# Patient Record
Sex: MALE | Race: WHITE | NOT HISPANIC OR LATINO | Employment: FULL TIME | URBAN - METROPOLITAN AREA
[De-identification: names, ages, dates, MRNs, and addresses within clinical notes are randomized per-mention and may not be internally consistent; named-entity substitution may affect disease eponyms.]

---

## 2023-02-23 LAB
ALANINE AMINOTRANSFERASE (SGPT) (U/L) IN SER/PLAS: 34 U/L (ref 10–52)
ALBUMIN (G/DL) IN SER/PLAS: 4.2 G/DL (ref 3.4–5)
ALKALINE PHOSPHATASE (U/L) IN SER/PLAS: 51 U/L (ref 33–120)
ANION GAP IN SER/PLAS: 8 MMOL/L (ref 10–20)
ASPARTATE AMINOTRANSFERASE (SGOT) (U/L) IN SER/PLAS: 22 U/L (ref 9–39)
BILIRUBIN TOTAL (MG/DL) IN SER/PLAS: 0.5 MG/DL (ref 0–1.2)
CALCIDIOL (25 OH VITAMIN D3) (NG/ML) IN SER/PLAS: 24 NG/ML
CALCIUM (MG/DL) IN SER/PLAS: 9.2 MG/DL (ref 8.6–10.3)
CARBON DIOXIDE, TOTAL (MMOL/L) IN SER/PLAS: 29 MMOL/L (ref 21–32)
CHLORIDE (MMOL/L) IN SER/PLAS: 107 MMOL/L (ref 98–107)
CHOLESTEROL (MG/DL) IN SER/PLAS: 156 MG/DL (ref 0–199)
CHOLESTEROL IN HDL (MG/DL) IN SER/PLAS: 39.7 MG/DL
CHOLESTEROL/HDL RATIO: 3.9
CREATININE (MG/DL) IN SER/PLAS: 1.48 MG/DL (ref 0.5–1.3)
ERYTHROCYTE DISTRIBUTION WIDTH (RATIO) BY AUTOMATED COUNT: 12.5 % (ref 11.5–14.5)
ERYTHROCYTE MEAN CORPUSCULAR HEMOGLOBIN CONCENTRATION (G/DL) BY AUTOMATED: 33.3 G/DL (ref 32–36)
ERYTHROCYTE MEAN CORPUSCULAR VOLUME (FL) BY AUTOMATED COUNT: 89 FL (ref 80–100)
ERYTHROCYTES (10*6/UL) IN BLOOD BY AUTOMATED COUNT: 4.89 X10E12/L (ref 4.5–5.9)
GFR MALE: 60 ML/MIN/1.73M2
GLUCOSE (MG/DL) IN SER/PLAS: 104 MG/DL (ref 74–99)
HEMATOCRIT (%) IN BLOOD BY AUTOMATED COUNT: 43.3 % (ref 41–52)
HEMOGLOBIN (G/DL) IN BLOOD: 14.4 G/DL (ref 13.5–17.5)
LDL: 98 MG/DL (ref 0–99)
LEUKOCYTES (10*3/UL) IN BLOOD BY AUTOMATED COUNT: 5.6 X10E9/L (ref 4.4–11.3)
PLATELETS (10*3/UL) IN BLOOD AUTOMATED COUNT: 251 X10E9/L (ref 150–450)
POTASSIUM (MMOL/L) IN SER/PLAS: 4.3 MMOL/L (ref 3.5–5.3)
PROTEIN TOTAL: 6.8 G/DL (ref 6.4–8.2)
SODIUM (MMOL/L) IN SER/PLAS: 140 MMOL/L (ref 136–145)
THYROTROPIN (MIU/L) IN SER/PLAS BY DETECTION LIMIT <= 0.05 MIU/L: 2.69 MIU/L (ref 0.44–3.98)
TRIGLYCERIDE (MG/DL) IN SER/PLAS: 94 MG/DL (ref 0–149)
UREA NITROGEN (MG/DL) IN SER/PLAS: 14 MG/DL (ref 6–23)
VLDL: 19 MG/DL (ref 0–40)

## 2023-04-06 ENCOUNTER — OFFICE VISIT (OUTPATIENT)
Dept: PRIMARY CARE | Facility: CLINIC | Age: 43
End: 2023-04-06
Payer: COMMERCIAL

## 2023-04-06 VITALS
DIASTOLIC BLOOD PRESSURE: 77 MMHG | BODY MASS INDEX: 28.65 KG/M2 | TEMPERATURE: 96.9 F | WEIGHT: 216.2 LBS | RESPIRATION RATE: 24 BRPM | HEIGHT: 73 IN | SYSTOLIC BLOOD PRESSURE: 110 MMHG | OXYGEN SATURATION: 97 % | HEART RATE: 54 BPM

## 2023-04-06 DIAGNOSIS — E55.9 VITAMIN D DEFICIENCY: ICD-10-CM

## 2023-04-06 DIAGNOSIS — F32.1 CURRENT MODERATE EPISODE OF MAJOR DEPRESSIVE DISORDER WITHOUT PRIOR EPISODE (MULTI): Primary | ICD-10-CM

## 2023-04-06 DIAGNOSIS — F41.9 ANXIETY: ICD-10-CM

## 2023-04-06 DIAGNOSIS — R73.01 IMPAIRED FASTING GLUCOSE: ICD-10-CM

## 2023-04-06 PROBLEM — E78.1 ESSENTIAL HYPERTRIGLYCERIDEMIA: Status: ACTIVE | Noted: 2023-04-06

## 2023-04-06 PROBLEM — F32.5 DEPRESSION, MAJOR, IN REMISSION (CMS-HCC): Status: RESOLVED | Noted: 2023-04-06 | Resolved: 2023-04-06

## 2023-04-06 PROBLEM — F10.11 ALCOHOL ABUSE, IN REMISSION: Status: ACTIVE | Noted: 2023-04-06

## 2023-04-06 PROBLEM — K42.9 UMBILICAL HERNIA: Status: ACTIVE | Noted: 2023-04-06

## 2023-04-06 PROBLEM — E87.6 HYPOKALEMIA: Status: ACTIVE | Noted: 2023-04-06

## 2023-04-06 PROBLEM — F32.5 DEPRESSION, MAJOR, IN REMISSION (CMS-HCC): Status: ACTIVE | Noted: 2023-04-06

## 2023-04-06 PROBLEM — Z86.16 HISTORY OF COVID-19: Status: ACTIVE | Noted: 2023-04-06

## 2023-04-06 PROCEDURE — 1036F TOBACCO NON-USER: CPT | Performed by: FAMILY MEDICINE

## 2023-04-06 PROCEDURE — 99214 OFFICE O/P EST MOD 30 MIN: CPT | Performed by: FAMILY MEDICINE

## 2023-04-06 RX ORDER — CHOLECALCIFEROL (VITAMIN D3) 125 MCG
5000 CAPSULE ORAL DAILY
COMMUNITY

## 2023-04-06 RX ORDER — SERTRALINE HYDROCHLORIDE 50 MG/1
50 TABLET, FILM COATED ORAL DAILY
COMMUNITY
Start: 2023-03-17 | End: 2023-07-03 | Stop reason: SDUPTHER

## 2023-04-06 RX ORDER — BUSPIRONE HYDROCHLORIDE 7.5 MG/1
1 TABLET ORAL 2 TIMES DAILY
COMMUNITY
Start: 2023-03-17 | End: 2023-07-03 | Stop reason: SDUPTHER

## 2023-04-06 ASSESSMENT — ENCOUNTER SYMPTOMS
PSYCHIATRIC NEGATIVE: 1
CARDIOVASCULAR NEGATIVE: 1
CONSTITUTIONAL NEGATIVE: 1
NEUROLOGICAL NEGATIVE: 1
RESPIRATORY NEGATIVE: 1

## 2023-04-06 NOTE — PROGRESS NOTES
"Subjective   Patient ID: Dakotah Pichardo is a 42 y.o. male who presents for Follow-up (6 week check ).    HPI patient today for follow-up and review of recent lab work.  Emotionally is doing much better on the medication which she is tolerating.  He admits that he has started to drink alcohol again but limits it to no more than 6 beers in a week.    Review of Systems   Constitutional: Negative.    Respiratory: Negative.     Cardiovascular: Negative.    Neurological: Negative.    Psychiatric/Behavioral: Negative.         Objective   /77 (BP Location: Right arm, Patient Position: Sitting)   Pulse 54   Temp 36.1 °C (96.9 °F)   Resp 24   Ht 1.854 m (6' 1\")   Wt 98.1 kg (216 lb 3.2 oz)   SpO2 97%   BMI 28.52 kg/m²     Physical Exam  Cardiovascular:      Rate and Rhythm: Normal rate and regular rhythm.   Pulmonary:      Effort: Pulmonary effort is normal.      Breath sounds: Normal breath sounds. No wheezing.   Neurological:      Mental Status: Mental status is at baseline.   Psychiatric:         Mood and Affect: Mood normal.         Behavior: Behavior normal.         Thought Content: Thought content normal.         Judgment: Judgment normal.       Recent Results (from the past 1344 hour(s))   Lipid Panel    Collection Time: 02/23/23 10:03 AM   Result Value Ref Range    Cholesterol 156 0 - 199 mg/dL    HDL 39.7 (A) mg/dL    Cholesterol/HDL Ratio 3.9     LDL 98 0 - 99 mg/dL    VLDL 19 0 - 40 mg/dL    Triglycerides 94 0 - 149 mg/dL   CBC    Collection Time: 02/23/23 10:03 AM   Result Value Ref Range    WBC 5.6 4.4 - 11.3 x10E9/L    RBC 4.89 4.50 - 5.90 x10E12/L    Hemoglobin 14.4 13.5 - 17.5 g/dL    Hematocrit 43.3 41.0 - 52.0 %    MCV 89 80 - 100 fL    MCHC 33.3 32.0 - 36.0 g/dL    Platelets 251 150 - 450 x10E9/L    RDW 12.5 11.5 - 14.5 %   TSH with reflex to Free T4 if abnormal    Collection Time: 02/23/23 10:03 AM   Result Value Ref Range    TSH 2.69 0.44 - 3.98 mIU/L   Vitamin D, Total    Collection Time: " 02/23/23 10:03 AM   Result Value Ref Range    Vitamin D, 25-Hydroxy 24 (A) ng/mL   Comprehensive Metabolic Panel    Collection Time: 02/23/23 10:03 AM   Result Value Ref Range    Glucose 104 (H) 74 - 99 mg/dL    Sodium 140 136 - 145 mmol/L    Potassium 4.3 3.5 - 5.3 mmol/L    Chloride 107 98 - 107 mmol/L    Bicarbonate 29 21 - 32 mmol/L    Anion Gap 8 (L) 10 - 20 mmol/L    Urea Nitrogen 14 6 - 23 mg/dL    Creatinine 1.48 (H) 0.50 - 1.30 mg/dL    GFR MALE 60 >90 mL/min/1.73m2    Calcium 9.2 8.6 - 10.3 mg/dL    Albumin 4.2 3.4 - 5.0 g/dL    Alkaline Phosphatase 51 33 - 120 U/L    Total Protein 6.8 6.4 - 8.2 g/dL    AST 22 9 - 39 U/L    Total Bilirubin 0.5 0.0 - 1.2 mg/dL    ALT (SGPT) 34 10 - 52 U/L       Turn to office 3 months with repeat fasting labs  Assessment/Plan   Problem List Items Addressed This Visit       Vitamin D deficiency     Start vitamin D3 5000 units daily         Relevant Orders    Comprehensive Metabolic Panel    Vitamin D 1,25 Dihydroxy    Follow Up In Primary Care    Impaired fasting glucose     Fasting blood sugar slightly elevated check A1c with next lab draw we discussed lifestyle/dietary modifications         Relevant Orders    Comprehensive Metabolic Panel    Hemoglobin A1C    Follow Up In Primary Care    Current moderate episode of major depressive disorder without prior episode (CMS/HCC) - Primary     Clinically improved continue current medication         Relevant Orders    Comprehensive Metabolic Panel    Follow Up In Primary Care    Anxiety     Clinically improved continue current medication         Relevant Orders    Comprehensive Metabolic Panel    Follow Up In Primary Care

## 2023-04-06 NOTE — ASSESSMENT & PLAN NOTE
Fasting blood sugar slightly elevated check A1c with next lab draw we discussed lifestyle/dietary modifications

## 2023-07-03 ENCOUNTER — TELEPHONE (OUTPATIENT)
Dept: PRIMARY CARE | Facility: CLINIC | Age: 43
End: 2023-07-03

## 2023-07-03 DIAGNOSIS — F41.9 ANXIETY: ICD-10-CM

## 2023-07-03 DIAGNOSIS — F32.1 CURRENT MODERATE EPISODE OF MAJOR DEPRESSIVE DISORDER WITHOUT PRIOR EPISODE (MULTI): ICD-10-CM

## 2023-07-03 RX ORDER — BUSPIRONE HYDROCHLORIDE 7.5 MG/1
7.5 TABLET ORAL 2 TIMES DAILY
Qty: 60 TABLET | Refills: 2 | Status: SHIPPED | OUTPATIENT
Start: 2023-07-03 | End: 2023-10-11 | Stop reason: SDUPTHER

## 2023-07-03 RX ORDER — SERTRALINE HYDROCHLORIDE 50 MG/1
50 TABLET, FILM COATED ORAL DAILY
Qty: 30 TABLET | Refills: 2 | Status: SHIPPED | OUTPATIENT
Start: 2023-07-03 | End: 2023-10-11 | Stop reason: SDUPTHER

## 2023-07-03 NOTE — TELEPHONE ENCOUNTER
Rx Refill Request Telephone Encounter    Name:  Dakotah Pichardo  :  [id number]  Medication Name:        Buspirone HCl 7.5 MG Oral Tablet 1 tab taken twice daily    Sertraline HCl 50 MG Oral Tablet 1 tab taken daily as directed                  Specific Pharmacy location:   [facility]    Date of last appointment:  2023  Date of next appointment:  07/10/2023  Best number to reach patient:  [phone]

## 2023-07-10 ENCOUNTER — OFFICE VISIT (OUTPATIENT)
Dept: PRIMARY CARE | Facility: CLINIC | Age: 43
End: 2023-07-10
Payer: COMMERCIAL

## 2023-07-10 VITALS
RESPIRATION RATE: 14 BRPM | OXYGEN SATURATION: 97 % | TEMPERATURE: 97.4 F | HEART RATE: 50 BPM | SYSTOLIC BLOOD PRESSURE: 124 MMHG | DIASTOLIC BLOOD PRESSURE: 86 MMHG | BODY MASS INDEX: 29.16 KG/M2 | WEIGHT: 221 LBS

## 2023-07-10 DIAGNOSIS — E55.9 VITAMIN D DEFICIENCY: ICD-10-CM

## 2023-07-10 DIAGNOSIS — F32.1 CURRENT MODERATE EPISODE OF MAJOR DEPRESSIVE DISORDER WITHOUT PRIOR EPISODE (MULTI): ICD-10-CM

## 2023-07-10 DIAGNOSIS — F41.9 ANXIETY: ICD-10-CM

## 2023-07-10 DIAGNOSIS — F10.10 ALCOHOL ABUSE: ICD-10-CM

## 2023-07-10 DIAGNOSIS — Z00.00 ANNUAL PHYSICAL EXAM: Primary | ICD-10-CM

## 2023-07-10 DIAGNOSIS — R73.01 IMPAIRED FASTING GLUCOSE: ICD-10-CM

## 2023-07-10 PROCEDURE — 1036F TOBACCO NON-USER: CPT | Performed by: FAMILY MEDICINE

## 2023-07-10 PROCEDURE — 99213 OFFICE O/P EST LOW 20 MIN: CPT | Performed by: FAMILY MEDICINE

## 2023-07-10 PROCEDURE — 99396 PREV VISIT EST AGE 40-64: CPT | Performed by: FAMILY MEDICINE

## 2023-07-10 ASSESSMENT — ENCOUNTER SYMPTOMS
OCCASIONAL FEELINGS OF UNSTEADINESS: 0
CHILLS: 0
ARTHRALGIAS: 0
LOSS OF SENSATION IN FEET: 0
FEVER: 0
DEPRESSION: 0
PALPITATIONS: 0
ABDOMINAL PAIN: 0
CONFUSION: 0
CHEST TIGHTNESS: 0
SHORTNESS OF BREATH: 0

## 2023-07-10 NOTE — ASSESSMENT & PLAN NOTE
Referral to substance abuse counselor  Also discussed group setting such as Alcoholics Anonymous.  Patient says he is not real comfortable in group settings.  We discussed long-term effects of alcohol abuse on his physical and mental wellbeing he verbalizes understanding.

## 2023-07-10 NOTE — ASSESSMENT & PLAN NOTE
Labs reviewed patient to get fasting lab work updated this month.  Referral to substance abuse counselor for his alcoholism.

## 2023-07-10 NOTE — PROGRESS NOTES
Subjective   Patient ID: Dakotah Pichardo is a 42 y.o. male who presents for Annual Exam (3 month).    HPI   Patient today for follow-up he has gone back to drinking alcohol not as much as previously but admits to drinking at least 212 packs of beer a week.  He says physically he does not feel as good as he should which he blames on the drinking.  He does not feel his drinking is interfering as much as it used to with family time.  He says he would like to try to quit completely.  Its not comfortable with the group setting.  Review of Systems   Constitutional:  Negative for chills and fever.   HENT:  Negative for congestion and ear pain.    Eyes:  Negative for visual disturbance.   Respiratory:  Negative for chest tightness and shortness of breath.    Cardiovascular:  Negative for chest pain and palpitations.   Gastrointestinal:  Negative for abdominal pain.   Musculoskeletal:  Negative for arthralgias.   Skin:  Negative for pallor.   Psychiatric/Behavioral:  Negative for confusion.        Objective   /86   Pulse 50   Temp 36.3 °C (97.4 °F)   Resp 14   Wt 100 kg (221 lb)   SpO2 97%   BMI 29.16 kg/m²     Physical Exam  Vitals and nursing note reviewed.   Constitutional:       General: He is not in acute distress.     Appearance: Normal appearance. He is not ill-appearing.   HENT:      Head: Normocephalic and atraumatic.      Right Ear: Tympanic membrane, ear canal and external ear normal.      Left Ear: Tympanic membrane, ear canal and external ear normal.      Mouth/Throat:      Pharynx: Oropharynx is clear.   Eyes:      Extraocular Movements: Extraocular movements intact.   Cardiovascular:      Rate and Rhythm: Normal rate and regular rhythm.      Pulses: Normal pulses.      Heart sounds: Normal heart sounds.   Pulmonary:      Effort: Pulmonary effort is normal.      Breath sounds: Normal breath sounds.   Abdominal:      General: Abdomen is flat. Bowel sounds are normal.      Palpations: Abdomen is soft.       Tenderness: There is no abdominal tenderness.   Musculoskeletal:         General: Normal range of motion.      Cervical back: Neck supple.   Skin:     General: Skin is warm.   Neurological:      Mental Status: He is alert and oriented to person, place, and time. Mental status is at baseline.   Psychiatric:         Mood and Affect: Mood normal.     Patient to get fasting lab work done this month call for results  Referral to substance abuse counselor  Continue current medications    Return to our office 3 months to reassess his case    Assessment/Plan

## 2023-10-10 ENCOUNTER — LAB (OUTPATIENT)
Dept: LAB | Facility: LAB | Age: 43
End: 2023-10-10
Payer: COMMERCIAL

## 2023-10-10 DIAGNOSIS — F41.9 ANXIETY: ICD-10-CM

## 2023-10-10 DIAGNOSIS — R73.01 IMPAIRED FASTING GLUCOSE: ICD-10-CM

## 2023-10-10 DIAGNOSIS — F32.1 CURRENT MODERATE EPISODE OF MAJOR DEPRESSIVE DISORDER WITHOUT PRIOR EPISODE (MULTI): ICD-10-CM

## 2023-10-10 DIAGNOSIS — E55.9 VITAMIN D DEFICIENCY: ICD-10-CM

## 2023-10-10 LAB
ALBUMIN SERPL BCP-MCNC: 4.6 G/DL (ref 3.4–5)
ALP SERPL-CCNC: 55 U/L (ref 33–120)
ALT SERPL W P-5'-P-CCNC: 17 U/L (ref 10–52)
ANION GAP SERPL CALC-SCNC: 10 MMOL/L (ref 10–20)
AST SERPL W P-5'-P-CCNC: 18 U/L (ref 9–39)
BILIRUB SERPL-MCNC: 0.9 MG/DL (ref 0–1.2)
BUN SERPL-MCNC: 11 MG/DL (ref 6–23)
CALCIUM SERPL-MCNC: 9.9 MG/DL (ref 8.6–10.3)
CHLORIDE SERPL-SCNC: 107 MMOL/L (ref 98–107)
CO2 SERPL-SCNC: 28 MMOL/L (ref 21–32)
CREAT SERPL-MCNC: 1.35 MG/DL (ref 0.5–1.3)
GFR SERPL CREATININE-BSD FRML MDRD: 67 ML/MIN/1.73M*2
GLUCOSE SERPL-MCNC: 101 MG/DL (ref 74–99)
POTASSIUM SERPL-SCNC: 4.7 MMOL/L (ref 3.5–5.3)
PROT SERPL-MCNC: 7.3 G/DL (ref 6.4–8.2)
SODIUM SERPL-SCNC: 140 MMOL/L (ref 136–145)

## 2023-10-10 PROCEDURE — 80053 COMPREHEN METABOLIC PANEL: CPT

## 2023-10-10 PROCEDURE — 82652 VIT D 1 25-DIHYDROXY: CPT

## 2023-10-10 PROCEDURE — 83036 HEMOGLOBIN GLYCOSYLATED A1C: CPT

## 2023-10-10 PROCEDURE — 36415 COLL VENOUS BLD VENIPUNCTURE: CPT

## 2023-10-11 ENCOUNTER — OFFICE VISIT (OUTPATIENT)
Dept: PRIMARY CARE | Facility: CLINIC | Age: 43
End: 2023-10-11
Payer: COMMERCIAL

## 2023-10-11 VITALS
RESPIRATION RATE: 14 BRPM | OXYGEN SATURATION: 97 % | HEART RATE: 57 BPM | WEIGHT: 216 LBS | SYSTOLIC BLOOD PRESSURE: 117 MMHG | BODY MASS INDEX: 28.5 KG/M2 | DIASTOLIC BLOOD PRESSURE: 75 MMHG | TEMPERATURE: 97.3 F

## 2023-10-11 DIAGNOSIS — F41.9 ANXIETY: ICD-10-CM

## 2023-10-11 DIAGNOSIS — Z13.29 THYROID DISORDER SCREEN: ICD-10-CM

## 2023-10-11 DIAGNOSIS — F32.1 CURRENT MODERATE EPISODE OF MAJOR DEPRESSIVE DISORDER WITHOUT PRIOR EPISODE (MULTI): Primary | ICD-10-CM

## 2023-10-11 DIAGNOSIS — E78.49 OTHER HYPERLIPIDEMIA: ICD-10-CM

## 2023-10-11 DIAGNOSIS — F10.10 ALCOHOL ABUSE: ICD-10-CM

## 2023-10-11 DIAGNOSIS — E55.9 VITAMIN D DEFICIENCY: ICD-10-CM

## 2023-10-11 DIAGNOSIS — R73.01 IMPAIRED FASTING GLUCOSE: ICD-10-CM

## 2023-10-11 LAB
EST. AVERAGE GLUCOSE BLD GHB EST-MCNC: 114 MG/DL
HBA1C MFR BLD: 5.6 %

## 2023-10-11 PROCEDURE — 99214 OFFICE O/P EST MOD 30 MIN: CPT | Performed by: FAMILY MEDICINE

## 2023-10-11 PROCEDURE — 1036F TOBACCO NON-USER: CPT | Performed by: FAMILY MEDICINE

## 2023-10-11 RX ORDER — BUSPIRONE HYDROCHLORIDE 7.5 MG/1
7.5 TABLET ORAL 2 TIMES DAILY
Qty: 180 TABLET | Refills: 3 | Status: SHIPPED | OUTPATIENT
Start: 2023-10-11 | End: 2024-02-13 | Stop reason: SDUPTHER

## 2023-10-11 RX ORDER — SERTRALINE HYDROCHLORIDE 50 MG/1
50 TABLET, FILM COATED ORAL DAILY
Qty: 90 TABLET | Refills: 3 | Status: SHIPPED | OUTPATIENT
Start: 2023-10-11 | End: 2024-02-13 | Stop reason: SDUPTHER

## 2023-10-11 ASSESSMENT — ENCOUNTER SYMPTOMS
ARTHRALGIAS: 0
CHILLS: 0
CONFUSION: 0
ABDOMINAL PAIN: 0
CHEST TIGHTNESS: 0
PALPITATIONS: 0
SHORTNESS OF BREATH: 0
FEVER: 0

## 2023-10-11 NOTE — ASSESSMENT & PLAN NOTE
Patient continues to struggle he admits to relapsing and is back to drinking he is trying to work through it on his own by weaning his alcohol intake he is reviewing some apps and other supportive media pieces.  We discussed AA says he is not interested in group settings.  We discussed individual counseling he says he will consider and see if he can find a new counselor he was going before with good results.

## 2023-10-11 NOTE — PROGRESS NOTES
Subjective   Patient ID: Dakotah Pichardo is a 43 y.o. male who presents for Follow-up (3 month).    HPI patient today for follow-up emotionally says the medicines are still working well and he would like to continue he needs them refilled.  He admits though he relapsed with his drinking and is been over indulging in alcohol again.  He is trying to wean himself back off of it.  Says is not really interested in formal recovery as far as AA or structured program.  He is considering going back to individual counseling.    Review of Systems   Constitutional:  Negative for chills and fever.   HENT:  Negative for congestion and ear pain.    Eyes:  Negative for visual disturbance.   Respiratory:  Negative for chest tightness and shortness of breath.    Cardiovascular:  Negative for chest pain and palpitations.   Gastrointestinal:  Negative for abdominal pain.   Musculoskeletal:  Negative for arthralgias.   Skin:  Negative for pallor.   Psychiatric/Behavioral:  Negative for confusion.        Objective   /75   Pulse 57   Temp 36.3 °C (97.3 °F)   Resp 14   Wt 98 kg (216 lb)   SpO2 97%   BMI 28.50 kg/m²     Physical Exam  Vitals and nursing note reviewed.   Constitutional:       General: He is not in acute distress.     Appearance: Normal appearance. He is not ill-appearing.   HENT:      Head: Normocephalic and atraumatic.      Right Ear: Tympanic membrane, ear canal and external ear normal.      Left Ear: Tympanic membrane, ear canal and external ear normal.      Mouth/Throat:      Pharynx: Oropharynx is clear.   Eyes:      Extraocular Movements: Extraocular movements intact.   Cardiovascular:      Rate and Rhythm: Normal rate and regular rhythm.      Pulses: Normal pulses.      Heart sounds: Normal heart sounds.   Pulmonary:      Effort: Pulmonary effort is normal.      Breath sounds: Normal breath sounds.   Abdominal:      General: Abdomen is flat. Bowel sounds are normal.      Palpations: Abdomen is soft.       Tenderness: There is no abdominal tenderness.   Musculoskeletal:         General: Normal range of motion.      Cervical back: Neck supple.   Skin:     General: Skin is warm.   Neurological:      Mental Status: He is alert and oriented to person, place, and time. Mental status is at baseline.   Psychiatric:         Mood and Affect: Mood normal.       Recent Results (from the past 1008 hour(s))   Comprehensive Metabolic Panel    Collection Time: 10/10/23  1:37 PM   Result Value Ref Range    Glucose 101 (H) 74 - 99 mg/dL    Sodium 140 136 - 145 mmol/L    Potassium 4.7 3.5 - 5.3 mmol/L    Chloride 107 98 - 107 mmol/L    Bicarbonate 28 21 - 32 mmol/L    Anion Gap 10 10 - 20 mmol/L    Urea Nitrogen 11 6 - 23 mg/dL    Creatinine 1.35 (H) 0.50 - 1.30 mg/dL    eGFR 67 >60 mL/min/1.73m*2    Calcium 9.9 8.6 - 10.3 mg/dL    Albumin 4.6 3.4 - 5.0 g/dL    Alkaline Phosphatase 55 33 - 120 U/L    Total Protein 7.3 6.4 - 8.2 g/dL    AST 18 9 - 39 U/L    Bilirubin, Total 0.9 0.0 - 1.2 mg/dL    ALT 17 10 - 52 U/L   Hemoglobin A1C    Collection Time: 10/10/23  1:37 PM   Result Value Ref Range    Hemoglobin A1C 5.6 see below %    Estimated Average Glucose 114 Not Established mg/dL     Recent labs reviewed with patient    Patient to follow a low sugar low-carb diet exercise and weight control    Refill Zoloft and BuSpar    Refuses flu vaccine    Return 4 months with repeat fasting labs    Assessment/Plan   Problem List Items Addressed This Visit             ICD-10-CM    Vitamin D deficiency E55.9     Continue to monitor         Relevant Orders    Vitamin D 25-Hydroxy,Total (for eval of Vitamin D levels)    Impaired fasting glucose R73.01     A1c at 5.6% reviewed following up low sugar diet low-carb diet exercising and weight control we will continue to monitor         Relevant Orders    Comprehensive Metabolic Panel    Hemoglobin A1C    Follow Up In Primary Care - Established    Current moderate episode of major depressive disorder  without prior episode (CMS/[facility]) - Primary F32.1     Refill Zoloft and BuSpar feels medicines are working well         Relevant Medications    busPIRone (Buspar) 7.5 mg tablet    Other Relevant Orders    CBC    Comprehensive Metabolic Panel    Follow Up In Primary Care - Established    Anxiety F41.9     Refill Zoloft and BuSpar         Relevant Medications    busPIRone (Buspar) 7.5 mg tablet    sertraline (Zoloft) 50 mg tablet    Other Relevant Orders    Follow Up In Primary Care - Established    Alcohol abuse F10.10     Patient continues to struggle he admits to relapsing and is back to drinking he is trying to work through it on his own by weaning his alcohol intake he is reviewing some apps and other supportive media pieces.  We discussed AA says he is not interested in group settings.  We discussed individual counseling he says he will consider and see if he can find a new counselor he was going before with good results.         Relevant Orders    CBC    Comprehensive Metabolic Panel    Follow Up In Primary Care - Established    Thyroid disorder screen Z13.29     TSH with reflex         Relevant Orders    TSH with reflex to Free T4 if abnormal     Other Visit Diagnoses         Codes    Other hyperlipidemia     E78.49    Relevant Orders    Cholesterol, LDL Direct    Comprehensive Metabolic Panel    Lipid Panel    Follow Up In Primary Care - Established

## 2023-10-12 LAB — 1,25(OH)2D3 SERPL-MCNC: 38.8 PG/ML (ref 19.9–79.3)

## 2024-02-13 ENCOUNTER — OFFICE VISIT (OUTPATIENT)
Dept: PRIMARY CARE | Facility: CLINIC | Age: 44
End: 2024-02-13
Payer: COMMERCIAL

## 2024-02-13 VITALS
WEIGHT: 208.4 LBS | HEART RATE: 59 BPM | DIASTOLIC BLOOD PRESSURE: 77 MMHG | BODY MASS INDEX: 27.62 KG/M2 | TEMPERATURE: 97.5 F | OXYGEN SATURATION: 97 % | RESPIRATION RATE: 16 BRPM | SYSTOLIC BLOOD PRESSURE: 113 MMHG | HEIGHT: 73 IN

## 2024-02-13 DIAGNOSIS — F41.9 ANXIETY: ICD-10-CM

## 2024-02-13 DIAGNOSIS — G47.09 OTHER INSOMNIA: ICD-10-CM

## 2024-02-13 DIAGNOSIS — F32.1 CURRENT MODERATE EPISODE OF MAJOR DEPRESSIVE DISORDER WITHOUT PRIOR EPISODE (MULTI): Primary | ICD-10-CM

## 2024-02-13 DIAGNOSIS — F10.11 ALCOHOL ABUSE, IN REMISSION: ICD-10-CM

## 2024-02-13 PROCEDURE — 99213 OFFICE O/P EST LOW 20 MIN: CPT | Performed by: FAMILY MEDICINE

## 2024-02-13 PROCEDURE — 1036F TOBACCO NON-USER: CPT | Performed by: FAMILY MEDICINE

## 2024-02-13 RX ORDER — BUSPIRONE HYDROCHLORIDE 7.5 MG/1
7.5 TABLET ORAL 2 TIMES DAILY
Qty: 180 TABLET | Refills: 3 | Status: SHIPPED | OUTPATIENT
Start: 2024-02-13 | End: 2024-06-06 | Stop reason: WASHOUT

## 2024-02-13 RX ORDER — SERTRALINE HYDROCHLORIDE 50 MG/1
50 TABLET, FILM COATED ORAL DAILY
Qty: 90 TABLET | Refills: 3 | Status: SHIPPED | OUTPATIENT
Start: 2024-02-13 | End: 2024-06-06 | Stop reason: WASHOUT

## 2024-02-13 RX ORDER — TRAZODONE HYDROCHLORIDE 50 MG/1
50 TABLET ORAL NIGHTLY PRN
Qty: 30 TABLET | Refills: 5 | Status: SHIPPED | OUTPATIENT
Start: 2024-02-13 | End: 2024-04-03 | Stop reason: SINTOL

## 2024-02-13 ASSESSMENT — ENCOUNTER SYMPTOMS
ABDOMINAL PAIN: 0
ARTHRALGIAS: 0
SHORTNESS OF BREATH: 0
FEVER: 0
CONFUSION: 0
PALPITATIONS: 0
CHILLS: 0
SLEEP DISTURBANCE: 1
CHEST TIGHTNESS: 0
NERVOUS/ANXIOUS: 1

## 2024-02-13 NOTE — ASSESSMENT & PLAN NOTE
Patient states he has 80 days sober.  We discussed considering starting Alcoholics Anonymous he is considering it but not ready yet to commit.

## 2024-02-13 NOTE — ASSESSMENT & PLAN NOTE
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  We will try adding trazodone 50 mg 30 to 60 minutes before going to bed.

## 2024-02-13 NOTE — PROGRESS NOTES
"Subjective   Patient ID: Dakotah Pichardo is a 43 y.o. male who presents for Follow-up (4 month follow up).    HPI patient today for follow-up did not get any of his lab work done.  He says he is has 80 days of sobriety.  He works typically 4 PM to midnight.  When he gets home he is up for few more hours because he is still lined up from work usually tries to get to bed by 4 AM but then often times is up by 8 or 9 AM.  He would like to see if there is something that can be done to help him get more sleep.  Also says that he did not realize he had refills on his medicines has been without his Zoloft and BuSpar for nearly 2 months.  He does feel he should go back on these medicines.    Review of Systems   Constitutional:  Negative for chills and fever.   HENT:  Negative for congestion and ear pain.    Eyes:  Negative for visual disturbance.   Respiratory:  Negative for chest tightness and shortness of breath.    Cardiovascular:  Negative for chest pain and palpitations.   Gastrointestinal:  Negative for abdominal pain.   Musculoskeletal:  Negative for arthralgias.   Skin:  Negative for pallor.   Psychiatric/Behavioral:  Positive for sleep disturbance. Negative for confusion, self-injury and suicidal ideas. The patient is nervous/anxious.         Depression       Objective   /77 (BP Location: Right arm, Patient Position: Sitting, BP Cuff Size: Adult long)   Pulse 59   Temp 36.4 °C (97.5 °F) (Temporal)   Resp 16   Ht 1.854 m (6' 1\")   Wt 94.5 kg (208 lb 6.4 oz)   SpO2 97%   BMI 27.50 kg/m²     Physical Exam  Vitals and nursing note reviewed.   Constitutional:       General: He is not in acute distress.     Appearance: Normal appearance. He is not ill-appearing.   HENT:      Head: Normocephalic and atraumatic.      Right Ear: Tympanic membrane, ear canal and external ear normal.      Left Ear: Tympanic membrane, ear canal and external ear normal.      Mouth/Throat:      Pharynx: Oropharynx is clear.   Eyes: "      Extraocular Movements: Extraocular movements intact.   Cardiovascular:      Rate and Rhythm: Normal rate and regular rhythm.      Pulses: Normal pulses.      Heart sounds: Normal heart sounds.   Pulmonary:      Effort: Pulmonary effort is normal.      Breath sounds: Normal breath sounds.   Abdominal:      General: Abdomen is flat. Bowel sounds are normal.      Palpations: Abdomen is soft.      Tenderness: There is no abdominal tenderness.   Musculoskeletal:         General: Normal range of motion.      Cervical back: Neck supple.   Skin:     General: Skin is warm.   Neurological:      Mental Status: He is alert and oriented to person, place, and time. Mental status is at baseline.   Psychiatric:         Mood and Affect: Mood normal.     Get fasting lab work completed    Refill BuSpar 7.5 mg twice daily and sertraline 50 mg once a day    Start trazodone 50 mg nightly    Call if any questions or concerns otherwise return to office in 6 weeks to reassess his case    Assessment/Plan   Problem List Items Addressed This Visit             ICD-10-CM    Alcohol abuse, in remission F10.11     Patient states he has 80 days sober.  We discussed considering starting Alcoholics Anonymous he is considering it but not ready yet to commit.         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  We will try adding trazodone 50 mg 30 to 60 minutes before going to bed.         Relevant Medications    traZODone (Desyrel) 50 mg tablet

## 2024-04-03 ENCOUNTER — LAB (OUTPATIENT)
Dept: LAB | Facility: LAB | Age: 44
End: 2024-04-03
Payer: COMMERCIAL

## 2024-04-03 ENCOUNTER — OFFICE VISIT (OUTPATIENT)
Dept: PRIMARY CARE | Facility: CLINIC | Age: 44
End: 2024-04-03
Payer: COMMERCIAL

## 2024-04-03 VITALS
OXYGEN SATURATION: 96 % | TEMPERATURE: 97.1 F | HEART RATE: 70 BPM | WEIGHT: 207 LBS | SYSTOLIC BLOOD PRESSURE: 105 MMHG | DIASTOLIC BLOOD PRESSURE: 66 MMHG | BODY MASS INDEX: 27.31 KG/M2 | RESPIRATION RATE: 14 BRPM

## 2024-04-03 DIAGNOSIS — F32.1 CURRENT MODERATE EPISODE OF MAJOR DEPRESSIVE DISORDER WITHOUT PRIOR EPISODE (MULTI): ICD-10-CM

## 2024-04-03 DIAGNOSIS — F32.1 CURRENT MODERATE EPISODE OF MAJOR DEPRESSIVE DISORDER WITHOUT PRIOR EPISODE (MULTI): Primary | ICD-10-CM

## 2024-04-03 DIAGNOSIS — F10.10 ALCOHOL ABUSE: ICD-10-CM

## 2024-04-03 DIAGNOSIS — G47.09 OTHER INSOMNIA: ICD-10-CM

## 2024-04-03 DIAGNOSIS — E55.9 VITAMIN D DEFICIENCY: ICD-10-CM

## 2024-04-03 DIAGNOSIS — E78.49 OTHER HYPERLIPIDEMIA: ICD-10-CM

## 2024-04-03 DIAGNOSIS — R73.01 IMPAIRED FASTING GLUCOSE: ICD-10-CM

## 2024-04-03 DIAGNOSIS — F41.9 ANXIETY: ICD-10-CM

## 2024-04-03 DIAGNOSIS — Z13.29 THYROID DISORDER SCREEN: ICD-10-CM

## 2024-04-03 LAB
25(OH)D3 SERPL-MCNC: 44 NG/ML (ref 30–100)
ALBUMIN SERPL BCP-MCNC: 4.4 G/DL (ref 3.4–5)
ALP SERPL-CCNC: 52 U/L (ref 33–120)
ALT SERPL W P-5'-P-CCNC: 15 U/L (ref 10–52)
ANION GAP SERPL CALC-SCNC: 11 MMOL/L (ref 10–20)
AST SERPL W P-5'-P-CCNC: 19 U/L (ref 9–39)
BILIRUB SERPL-MCNC: 0.4 MG/DL (ref 0–1.2)
BUN SERPL-MCNC: 12 MG/DL (ref 6–23)
CALCIUM SERPL-MCNC: 9.2 MG/DL (ref 8.6–10.3)
CHLORIDE SERPL-SCNC: 105 MMOL/L (ref 98–107)
CHOLEST SERPL-MCNC: 181 MG/DL (ref 0–199)
CHOLESTEROL/HDL RATIO: 2.8
CO2 SERPL-SCNC: 26 MMOL/L (ref 21–32)
CREAT SERPL-MCNC: 0.99 MG/DL (ref 0.5–1.3)
EGFRCR SERPLBLD CKD-EPI 2021: >90 ML/MIN/1.73M*2
ERYTHROCYTE [DISTWIDTH] IN BLOOD BY AUTOMATED COUNT: 13.4 % (ref 11.5–14.5)
GLUCOSE SERPL-MCNC: 79 MG/DL (ref 74–99)
HCT VFR BLD AUTO: 45.7 % (ref 41–52)
HDLC SERPL-MCNC: 65.2 MG/DL
HGB BLD-MCNC: 14.9 G/DL (ref 13.5–17.5)
LDLC SERPL CALC-MCNC: 67 MG/DL
LDLC SERPL DIRECT ASSAY-MCNC: 98 MG/DL (ref 0–129)
MCH RBC QN AUTO: 29.3 PG (ref 26–34)
MCHC RBC AUTO-ENTMCNC: 32.6 G/DL (ref 32–36)
MCV RBC AUTO: 90 FL (ref 80–100)
NON HDL CHOLESTEROL: 116 MG/DL (ref 0–149)
NRBC BLD-RTO: 0 /100 WBCS (ref 0–0)
PLATELET # BLD AUTO: 257 X10*3/UL (ref 150–450)
POTASSIUM SERPL-SCNC: 3.7 MMOL/L (ref 3.5–5.3)
PROT SERPL-MCNC: 7.1 G/DL (ref 6.4–8.2)
RBC # BLD AUTO: 5.08 X10*6/UL (ref 4.5–5.9)
SODIUM SERPL-SCNC: 138 MMOL/L (ref 136–145)
TRIGL SERPL-MCNC: 244 MG/DL (ref 0–149)
TSH SERPL-ACNC: 1.86 MIU/L (ref 0.44–3.98)
VLDL: 49 MG/DL (ref 0–40)
WBC # BLD AUTO: 6.8 X10*3/UL (ref 4.4–11.3)

## 2024-04-03 PROCEDURE — 80061 LIPID PANEL: CPT

## 2024-04-03 PROCEDURE — 84443 ASSAY THYROID STIM HORMONE: CPT

## 2024-04-03 PROCEDURE — 83721 ASSAY OF BLOOD LIPOPROTEIN: CPT

## 2024-04-03 PROCEDURE — 80053 COMPREHEN METABOLIC PANEL: CPT

## 2024-04-03 PROCEDURE — 85027 COMPLETE CBC AUTOMATED: CPT

## 2024-04-03 PROCEDURE — 36415 COLL VENOUS BLD VENIPUNCTURE: CPT

## 2024-04-03 PROCEDURE — 83036 HEMOGLOBIN GLYCOSYLATED A1C: CPT

## 2024-04-03 PROCEDURE — 82306 VITAMIN D 25 HYDROXY: CPT

## 2024-04-03 PROCEDURE — 99213 OFFICE O/P EST LOW 20 MIN: CPT | Performed by: FAMILY MEDICINE

## 2024-04-03 PROCEDURE — 1036F TOBACCO NON-USER: CPT | Performed by: FAMILY MEDICINE

## 2024-04-03 ASSESSMENT — ENCOUNTER SYMPTOMS
FEVER: 0
CHILLS: 0
ARTHRALGIAS: 0
SHORTNESS OF BREATH: 0
CONFUSION: 0
ABDOMINAL PAIN: 0
PALPITATIONS: 0
CHEST TIGHTNESS: 0

## 2024-04-03 NOTE — ASSESSMENT & PLAN NOTE
Did not tolerate trazodone due to side effect.  At this point we reviewed sleep hygiene and practices.  Hold off on any additional medication.

## 2024-04-03 NOTE — PROGRESS NOTES
Subjective   Patient ID: Dakotah Pichardo is a 43 y.o. male who presents for Follow-up (6 week).    HPI   Patient today for follow-up still has not had his lab work done but says he will do it later today.  He says he did not tolerate the trazodone stating that it gave him a headache.  He says he has gotten headaches before from medicines for sleep even melatonin.  He is still taking Zoloft and BuSpar.  Review of Systems   Constitutional:  Negative for chills and fever.   HENT:  Negative for congestion and ear pain.    Eyes:  Negative for visual disturbance.   Respiratory:  Negative for chest tightness and shortness of breath.    Cardiovascular:  Negative for chest pain and palpitations.   Gastrointestinal:  Negative for abdominal pain.   Musculoskeletal:  Negative for arthralgias.   Skin:  Negative for pallor.   Psychiatric/Behavioral:  Negative for confusion.        Objective   /66   Pulse 70   Temp 36.2 °C (97.1 °F)   Resp 14   Wt 93.9 kg (207 lb)   SpO2 96%   BMI 27.31 kg/m²     Physical Exam  Vitals and nursing note reviewed.   Constitutional:       General: He is not in acute distress.     Appearance: Normal appearance. He is not ill-appearing.   HENT:      Head: Normocephalic and atraumatic.      Right Ear: Tympanic membrane, ear canal and external ear normal.      Left Ear: Tympanic membrane, ear canal and external ear normal.      Mouth/Throat:      Pharynx: Oropharynx is clear.   Eyes:      Extraocular Movements: Extraocular movements intact.   Cardiovascular:      Rate and Rhythm: Normal rate and regular rhythm.      Pulses: Normal pulses.      Heart sounds: Normal heart sounds.   Pulmonary:      Effort: Pulmonary effort is normal.      Breath sounds: Normal breath sounds.   Abdominal:      General: Abdomen is flat. Bowel sounds are normal.      Palpations: Abdomen is soft.      Tenderness: There is no abdominal tenderness.   Musculoskeletal:         General: Normal range of motion.       Cervical back: Neck supple.   Skin:     General: Skin is warm.   Neurological:      Mental Status: He is alert and oriented to person, place, and time. Mental status is at baseline.   Psychiatric:         Mood and Affect: Mood normal.     Get fasting lab work done call for results if he does not hear from our office    Continue Zoloft and BuSpar as prescribed    Work on better sleep hygiene practices and avoidance of alcohol    Return in 4 months to reassess his case    Assessment/Plan   Problem List Items Addressed This Visit             ICD-10-CM    Current moderate episode of major depressive disorder without prior episode (CMS/[facility]) - Primary F32.1     Continue Zoloft 50 mg daily         Anxiety F41.9     Continue BuSpar 7.5 mg twice daily along with Zoloft 50 mg daily         Other insomnia G47.09     Did not tolerate trazodone due to side effect.  At this point we reviewed sleep hygiene and practices.  Hold off on any additional medication.

## 2024-04-04 ENCOUNTER — TELEPHONE (OUTPATIENT)
Dept: PRIMARY CARE | Facility: CLINIC | Age: 44
End: 2024-04-04
Payer: COMMERCIAL

## 2024-04-04 DIAGNOSIS — E78.1 ESSENTIAL HYPERTRIGLYCERIDEMIA: Primary | ICD-10-CM

## 2024-04-04 DIAGNOSIS — R73.01 IMPAIRED FASTING GLUCOSE: ICD-10-CM

## 2024-04-04 DIAGNOSIS — E55.9 VITAMIN D DEFICIENCY: ICD-10-CM

## 2024-04-04 LAB
EST. AVERAGE GLUCOSE BLD GHB EST-MCNC: 120 MG/DL
HBA1C MFR BLD: 5.8 %

## 2024-04-04 NOTE — TELEPHONE ENCOUNTER
----- Message from Alpesh Anderson MD sent at 4/4/2024  9:42 AM EDT -----  Recent labs overall stable except A1c is at 5.8% which places him in the prediabetic category he needs to follow diabetic diet and try to remain off the alcohol.  Cholesterol numbers are stable but triglycerides are elevated he needs to follow a low-fat low-cholesterol diet as well.  Keep regular follow-up appointment with repeat fasting lab work a week before orders have been placed in the system.

## 2024-06-06 ENCOUNTER — OFFICE VISIT (OUTPATIENT)
Dept: PRIMARY CARE | Facility: CLINIC | Age: 44
End: 2024-06-06
Payer: COMMERCIAL

## 2024-06-06 VITALS
SYSTOLIC BLOOD PRESSURE: 111 MMHG | OXYGEN SATURATION: 95 % | DIASTOLIC BLOOD PRESSURE: 64 MMHG | HEIGHT: 74 IN | WEIGHT: 213 LBS | RESPIRATION RATE: 14 BRPM | HEART RATE: 86 BPM | TEMPERATURE: 98.2 F | BODY MASS INDEX: 27.34 KG/M2

## 2024-06-06 DIAGNOSIS — F41.9 ANXIETY: ICD-10-CM

## 2024-06-06 DIAGNOSIS — F32.1 CURRENT MODERATE EPISODE OF MAJOR DEPRESSIVE DISORDER WITHOUT PRIOR EPISODE (MULTI): ICD-10-CM

## 2024-06-06 DIAGNOSIS — G25.81 RESTLESS LEG SYNDROME: ICD-10-CM

## 2024-06-06 DIAGNOSIS — G47.09 OTHER INSOMNIA: ICD-10-CM

## 2024-06-06 DIAGNOSIS — F10.11 ALCOHOL ABUSE, IN REMISSION: Primary | ICD-10-CM

## 2024-06-06 PROCEDURE — 1036F TOBACCO NON-USER: CPT | Performed by: FAMILY MEDICINE

## 2024-06-06 PROCEDURE — 99214 OFFICE O/P EST MOD 30 MIN: CPT | Performed by: FAMILY MEDICINE

## 2024-06-06 RX ORDER — PRAZOSIN HYDROCHLORIDE 1 MG/1
1 CAPSULE ORAL NIGHTLY
COMMUNITY
Start: 2024-05-24 | End: 2024-06-06 | Stop reason: SDUPTHER

## 2024-06-06 RX ORDER — SERTRALINE HYDROCHLORIDE 100 MG/1
100 TABLET, FILM COATED ORAL DAILY
Qty: 30 TABLET | Refills: 3 | Status: SHIPPED | OUTPATIENT
Start: 2024-06-06 | End: 2025-06-06

## 2024-06-06 RX ORDER — BUSPIRONE HYDROCHLORIDE 10 MG/1
10 TABLET ORAL 3 TIMES DAILY
Qty: 90 TABLET | Refills: 3 | Status: SHIPPED | OUTPATIENT
Start: 2024-06-06 | End: 2025-06-06

## 2024-06-06 RX ORDER — QUETIAPINE FUMARATE 50 MG/1
50 TABLET, FILM COATED ORAL NIGHTLY
COMMUNITY
Start: 2024-05-28 | End: 2024-06-06 | Stop reason: SDUPTHER

## 2024-06-06 RX ORDER — CYCLOBENZAPRINE HCL 10 MG
10 TABLET ORAL 3 TIMES DAILY PRN
Qty: 90 TABLET | Refills: 3 | Status: SHIPPED | OUTPATIENT
Start: 2024-06-06 | End: 2025-06-06

## 2024-06-06 RX ORDER — ACAMPROSATE CALCIUM 333 MG/1
666 TABLET, DELAYED RELEASE ORAL 3 TIMES DAILY
Qty: 180 TABLET | Refills: 3 | Status: SHIPPED | OUTPATIENT
Start: 2024-06-06 | End: 2025-06-06

## 2024-06-06 RX ORDER — ACAMPROSATE CALCIUM 333 MG/1
666 TABLET, DELAYED RELEASE ORAL 3 TIMES DAILY
COMMUNITY
Start: 2024-06-02 | End: 2024-06-06 | Stop reason: SDUPTHER

## 2024-06-06 RX ORDER — LANOLIN ALCOHOL/MO/W.PET/CERES
CREAM (GRAM) TOPICAL
COMMUNITY
Start: 2024-04-30

## 2024-06-06 RX ORDER — HYDROXYZINE PAMOATE 50 MG/1
50 CAPSULE ORAL 4 TIMES DAILY PRN
COMMUNITY

## 2024-06-06 RX ORDER — CYCLOBENZAPRINE HCL 10 MG
10 TABLET ORAL 3 TIMES DAILY PRN
COMMUNITY
Start: 2024-05-18 | End: 2024-06-06 | Stop reason: SDUPTHER

## 2024-06-06 RX ORDER — QUETIAPINE FUMARATE 50 MG/1
50 TABLET, FILM COATED ORAL NIGHTLY
Qty: 30 TABLET | Refills: 3 | Status: SHIPPED | OUTPATIENT
Start: 2024-06-06 | End: 2025-06-06

## 2024-06-06 RX ORDER — SERTRALINE HYDROCHLORIDE 100 MG/1
100 TABLET, FILM COATED ORAL DAILY
COMMUNITY
Start: 2024-05-22 | End: 2024-06-06 | Stop reason: SDUPTHER

## 2024-06-06 RX ORDER — PRAZOSIN HYDROCHLORIDE 1 MG/1
1 CAPSULE ORAL NIGHTLY
Qty: 30 CAPSULE | Refills: 3 | Status: SHIPPED | OUTPATIENT
Start: 2024-06-06

## 2024-06-06 RX ORDER — PRAMIPEXOLE DIHYDROCHLORIDE 0.12 MG/1
0.12 TABLET ORAL 3 TIMES DAILY
COMMUNITY
Start: 2024-04-30 | End: 2024-06-06 | Stop reason: SDUPTHER

## 2024-06-06 RX ORDER — HYDROXYZINE PAMOATE 50 MG/1
50 CAPSULE ORAL 4 TIMES DAILY PRN
COMMUNITY
Start: 2024-05-28 | End: 2024-06-06 | Stop reason: WASHOUT

## 2024-06-06 RX ORDER — CLONIDINE HYDROCHLORIDE 0.1 MG/1
0.1 TABLET ORAL 4 TIMES DAILY PRN
COMMUNITY
Start: 2024-05-28

## 2024-06-06 RX ORDER — BUSPIRONE HYDROCHLORIDE 10 MG/1
10 TABLET ORAL 3 TIMES DAILY
COMMUNITY
Start: 2024-05-07 | End: 2024-06-06 | Stop reason: SDUPTHER

## 2024-06-06 RX ORDER — PRAMIPEXOLE DIHYDROCHLORIDE 0.12 MG/1
0.12 TABLET ORAL 3 TIMES DAILY
Qty: 90 TABLET | Refills: 3 | Status: SHIPPED | OUTPATIENT
Start: 2024-06-06 | End: 2025-06-06

## 2024-06-06 ASSESSMENT — ENCOUNTER SYMPTOMS
ABDOMINAL PAIN: 0
CHILLS: 0
FEVER: 0
CONFUSION: 0
SHORTNESS OF BREATH: 0
CHEST TIGHTNESS: 0
PALPITATIONS: 0
ARTHRALGIAS: 0

## 2024-06-06 NOTE — PROGRESS NOTES
"Subjective   Patient ID: Dakotah Pichardo is a 43 y.o. male who presents for Follow-up (Rehab).    HPI   Patient today for follow-up.  He was recently discharged from an inpatient recovery center within the past week.  He went there voluntarily for help secondary to his alcohol abuse.  He says the program is very good he got a lot out of it he has been sober now for just over a month.  Medications were added and adjusted while he was there he brings an updated list needs some of the medicines refilled.  He also has made arrangements for individual outpatient counseling.  He did attend some AA meetings while he was there however he says it did not seem to be for him he found going to those meetings cause him to crave alcohol even more.  He is planning on returning to work next week.  Review of Systems   Constitutional:  Negative for chills and fever.   HENT:  Negative for congestion and ear pain.    Eyes:  Negative for visual disturbance.   Respiratory:  Negative for chest tightness and shortness of breath.    Cardiovascular:  Negative for chest pain and palpitations.   Gastrointestinal:  Negative for abdominal pain.   Musculoskeletal:  Negative for arthralgias.   Skin:  Negative for pallor.   Psychiatric/Behavioral:  Negative for confusion.        Objective   /64   Pulse 86   Temp 36.8 °C (98.2 °F)   Resp 14   Ht 1.88 m (6' 2\")   Wt 96.6 kg (213 lb)   SpO2 95%   BMI 27.35 kg/m²     Physical Exam  Vitals and nursing note reviewed.   Constitutional:       General: He is not in acute distress.     Appearance: Normal appearance. He is not ill-appearing.   HENT:      Head: Normocephalic and atraumatic.      Right Ear: Tympanic membrane, ear canal and external ear normal.      Left Ear: Tympanic membrane, ear canal and external ear normal.      Mouth/Throat:      Pharynx: Oropharynx is clear.   Eyes:      Extraocular Movements: Extraocular movements intact.   Cardiovascular:      Rate and Rhythm: Normal rate " and regular rhythm.      Pulses: Normal pulses.      Heart sounds: Normal heart sounds.   Pulmonary:      Effort: Pulmonary effort is normal.      Breath sounds: Normal breath sounds.   Abdominal:      General: Abdomen is flat. Bowel sounds are normal.      Palpations: Abdomen is soft.      Tenderness: There is no abdominal tenderness.   Musculoskeletal:         General: Normal range of motion.      Cervical back: Neck supple.   Skin:     General: Skin is warm.   Neurological:      Mental Status: He is alert and oriented to person, place, and time. Mental status is at baseline.   Psychiatric:         Mood and Affect: Mood normal.     Meds reviewed necessary refills provided    Patient encouraged to start counseling    He is to call if he has any questions or concerns plus he has people and contacts to reach out to if he needs to for additional support and help.    He is return to our office in 4 weeks to check on his progress.    He is going to use the Vistaril as the as needed rescue medicine and try to stay off of the clonidine knowing that it can lower his blood pressure.    Assessment/Plan   Problem List Items Addressed This Visit             ICD-10-CM    Alcohol abuse, in remission - Primary F10.11     Patient is status post intensive inpatient recovery treatment.  He was at a [facility].  For just around 1 month and was recently discharged vital week ago.  Currently on Campral.  Also set up for outpatient individual counseling.         Relevant Orders    Follow Up In Primary Care - Established    Current moderate episode of major depressive disorder without prior episode (Multi) F32.1     Currently stable continue current medication and counseling         Relevant Orders    Follow Up In Primary Care - Established    Anxiety F41.9     Stable continue current medication         Relevant Orders    Follow Up In Primary Care - Established    Other insomnia G47.09     Stable continue current medication          Restless leg syndrome G25.81     Stable continue current medication

## 2024-06-06 NOTE — ASSESSMENT & PLAN NOTE
Patient is status post intensive inpatient recovery treatment.  He was at a [facility].  For just around 1 month and was recently discharged vital week ago.  Currently on Campral.  Also set up for outpatient individual counseling.

## 2024-07-02 ENCOUNTER — HOSPITAL ENCOUNTER (EMERGENCY)
Facility: HOSPITAL | Age: 44
Discharge: HOME | End: 2024-07-02
Payer: COMMERCIAL

## 2024-07-02 ENCOUNTER — APPOINTMENT (OUTPATIENT)
Dept: RADIOLOGY | Facility: HOSPITAL | Age: 44
End: 2024-07-02
Payer: COMMERCIAL

## 2024-07-02 VITALS
OXYGEN SATURATION: 98 % | WEIGHT: 218 LBS | SYSTOLIC BLOOD PRESSURE: 124 MMHG | DIASTOLIC BLOOD PRESSURE: 84 MMHG | TEMPERATURE: 98.1 F | HEIGHT: 74 IN | RESPIRATION RATE: 18 BRPM | BODY MASS INDEX: 27.98 KG/M2 | HEART RATE: 80 BPM

## 2024-07-02 DIAGNOSIS — S22.42XA CLOSED FRACTURE OF MULTIPLE RIBS OF LEFT SIDE, INITIAL ENCOUNTER: Primary | ICD-10-CM

## 2024-07-02 PROCEDURE — 99283 EMERGENCY DEPT VISIT LOW MDM: CPT

## 2024-07-02 PROCEDURE — 71101 X-RAY EXAM UNILAT RIBS/CHEST: CPT | Mod: LEFT SIDE | Performed by: RADIOLOGY

## 2024-07-02 PROCEDURE — 2500000001 HC RX 250 WO HCPCS SELF ADMINISTERED DRUGS (ALT 637 FOR MEDICARE OP): Performed by: PHYSICIAN ASSISTANT

## 2024-07-02 PROCEDURE — 71101 X-RAY EXAM UNILAT RIBS/CHEST: CPT | Mod: LT

## 2024-07-02 RX ORDER — LIDOCAINE 50 MG/G
1 PATCH TOPICAL DAILY
Qty: 7 PATCH | Refills: 0 | Status: SHIPPED | OUTPATIENT
Start: 2024-07-02

## 2024-07-02 RX ORDER — ACETAMINOPHEN 325 MG/1
975 TABLET ORAL ONCE
Status: COMPLETED | OUTPATIENT
Start: 2024-07-02 | End: 2024-07-02

## 2024-07-02 RX ORDER — IBUPROFEN 400 MG/1
400 TABLET ORAL ONCE
Status: COMPLETED | OUTPATIENT
Start: 2024-07-02 | End: 2024-07-02

## 2024-07-02 RX ORDER — KETOROLAC TROMETHAMINE 10 MG/1
10 TABLET, FILM COATED ORAL EVERY 6 HOURS PRN
Qty: 20 TABLET | Refills: 0 | Status: SHIPPED | OUTPATIENT
Start: 2024-07-02 | End: 2024-07-07

## 2024-07-02 RX ORDER — TIZANIDINE 2 MG/1
2 TABLET ORAL EVERY 6 HOURS PRN
Qty: 30 TABLET | Refills: 0 | Status: SHIPPED | OUTPATIENT
Start: 2024-07-02 | End: 2024-07-12

## 2024-07-02 ASSESSMENT — LIFESTYLE VARIABLES
EVER FELT BAD OR GUILTY ABOUT YOUR DRINKING: NO
HAVE PEOPLE ANNOYED YOU BY CRITICIZING YOUR DRINKING: NO
EVER HAD A DRINK FIRST THING IN THE MORNING TO STEADY YOUR NERVES TO GET RID OF A HANGOVER: NO
HAVE YOU EVER FELT YOU SHOULD CUT DOWN ON YOUR DRINKING: NO
TOTAL SCORE: 0

## 2024-07-02 ASSESSMENT — PAIN DESCRIPTION - ORIENTATION: ORIENTATION: LEFT

## 2024-07-02 ASSESSMENT — COLUMBIA-SUICIDE SEVERITY RATING SCALE - C-SSRS
6. HAVE YOU EVER DONE ANYTHING, STARTED TO DO ANYTHING, OR PREPARED TO DO ANYTHING TO END YOUR LIFE?: NO
2. HAVE YOU ACTUALLY HAD ANY THOUGHTS OF KILLING YOURSELF?: NO
1. IN THE PAST MONTH, HAVE YOU WISHED YOU WERE DEAD OR WISHED YOU COULD GO TO SLEEP AND NOT WAKE UP?: NO

## 2024-07-02 ASSESSMENT — PAIN - FUNCTIONAL ASSESSMENT: PAIN_FUNCTIONAL_ASSESSMENT: 0-10

## 2024-07-02 ASSESSMENT — PAIN SCALES - GENERAL: PAINLEVEL_OUTOF10: 5 - MODERATE PAIN

## 2024-07-02 ASSESSMENT — PAIN DESCRIPTION - PAIN TYPE: TYPE: ACUTE PAIN

## 2024-07-02 NOTE — Clinical Note
Dakotah Pichardo was seen and treated in our emergency department on 7/2/2024.  He may return to work on 07/04/2024.       If you have any questions or concerns, please don't hesitate to call.      Jose G Moeller PA-C

## 2024-07-02 NOTE — ED PROVIDER NOTES
EMERGENCY MEDICINE EVALUATION NOTE    History of Present Illness     Chief Complaint:   Chief Complaint   Patient presents with    Rib Injury       HPI: Dakotah Pichardo is a 43 y.o. male presents with a chief complaint of left rib pain.  Patient reports that he was playing baseball a few days ago when he collided with his son.  He states that after colliding with his son he fell down landing on his left ribs.  He reports that his arm was underneath him so that there is a lot of pressure and upward force into the ribs.  Patient states that since then he been having a lot of pain over the left ribs.  He denies any significant shortness of breath.  Patient reports that anytime he coughs or sneezes though he has a significant line of pain over the left ribs.  He reports pain is worse with movement.  Patient has been taking over-the-counter medication for symptoms but it seems to be not improving.    Previous History     Past Medical History:   Diagnosis Date    Depression, major, in remission ([facility]) 2023     Past Surgical History:   Procedure Laterality Date    OTHER SURGICAL HISTORY  2022    Hernia repair     Social History     Tobacco Use    Smoking status: Former     Current packs/day: 0.00     Average packs/day: 1.5 packs/day for 23.5 years (35.3 ttl pk-yrs)     Types: Cigarettes     Start date: 6/15/1994     Quit date: 2018     Years since quittin.5     Passive exposure: Past    Smokeless tobacco: Never   Vaping Use    Vaping status: Former    Quit date: 2024    Substances: Nicotine, Flavoring    Devices: Pre-filled or refillable cartridge, Refillable tank   Substance Use Topics    Alcohol use: Not Currently     Comment: monthly    Drug use: Never     Family History   Problem Relation Name Age of Onset    No Known Problems Mother      No Known Problems Father       No Known Allergies  Current Outpatient Medications   Medication Instructions    acamprosate (CAMPRAL) 666 mg, oral, 3 times  daily    busPIRone (BUSPAR) 10 mg, oral, 3 times daily    cholecalciferol (VITAMIN D-3) 5,000 Units, oral, Daily    cloNIDine (CATAPRES) 0.1 mg, oral, 4 times daily PRN    cyclobenzaprine (FLEXERIL) 10 mg, oral, 3 times daily PRN    hydrOXYzine pamoate (VISTARIL) 50 mg, oral, 4 times daily PRN    ketorolac (TORADOL) 10 mg, oral, Every 6 hours PRN    lidocaine (Lidoderm) 5 % patch 1 patch, transdermal, Daily, Remove & discard patch within 12 hours or as directed by MD.    pramipexole (MIRAPEX) 0.125 mg, oral, 3 times daily    prazosin (MINIPRESS) 1 mg, oral, Nightly    QUEtiapine (SEROQUEL) 50 mg, oral, Nightly    sertraline (ZOLOFT) 100 mg, oral, Daily    thiamine 100 mg tablet     tiZANidine (ZANAFLEX) 2 mg, oral, Every 6 hours PRN       Physical Exam     Appearance: Alert, oriented , cooperative     Skin: Intact,  dry skin, no lesions, rash, petechiae or purpura.      Eyes: PERRLA, EOMs intact, Conjunctiva pink      ENT: Hearing grossly intact. Pharynx clear     Neck: Supple. Trachea at midline.      Pulmonary: Clear bilaterally. No rales, rhonchi or wheezing. No accessory muscle use or stridor.     Cardiac: Normal rate and rhythm without murmur.  Diffuse tenderness over left lateral rib cage.  No noted bruising or abrasions present.     Abdomen: Soft, nontender, active bowel sounds.     Musculoskeletal: Full range of motion.     Neurological:Cranial nerves II through XII are grossly intact, normal sensation, no weakness, no focal findings identified.     Results   Labs Reviewed - No data to display  XR ribs 2 views left w chest pa or ap   Final Result   1.  Left 6th and 7th rib fractures, as above.   2. No focal infiltrate or pneumothorax identified.        MACRO:   None        Signed by: Samy Ornelas 7/2/2024 2:03 PM   Dictation workstation:   [id number]            ED Course & Medical Decision Making     Medications   acetaminophen (Tylenol) tablet 975 mg (975 mg oral Given 7/2/24 1354)   ibuprofen tablet 400  "mg (400 mg oral Given 7/2/24 1355)     Heart Rate:  [89]   Temperature:  [36.7 °C (98.1 °F)]   Respirations:  [15]   BP: (127)/(77)   Height:  [188 cm (6' 2\")]   Weight:  [98.9 kg (218 lb)]   Pulse Ox:  [98 %]    ED Course as of 07/02/24 1411   Tue Jul 02, 2024   1408 Updated patient on the results of his x-ray.  Patient's x-ray does show an acute sixth and seventh rib fracture.  Patient will be given incentive spirometer prior to discharge.  Plan of care was discussed with patient.  He is currently in recovery and cannot have any opiate medications.  Patient will be discharged home with tizanidine, Toradol, and lidocaine patches.  Patient was instructed to follow-up with her primary care provider 1 to 2 days return here mainly with any worsening symptoms.  Patient will be given a work note for the next 2 days.  He was encouraged return to immediate any worsening symptoms. [CJ]      ED Course User Index  [CJ] Jose G Moeller PA-C         Diagnoses as of 07/02/24 1411   Closed fracture of multiple ribs of left side, initial encounter       Procedures   Procedures    Diagnosis     1. Closed fracture of multiple ribs of left side, initial encounter        Disposition   Discharged    ED Prescriptions       Medication Sig Dispense Start Date End Date Auth. Provider    tiZANidine (Zanaflex) 2 mg tablet Take 1 tablet (2 mg) by mouth every 6 hours if needed for muscle spasms for up to 10 days. 30 tablet 7/2/2024 7/12/2024 Jose G Moeller PA-C    ketorolac (Toradol) 10 mg tablet Take 1 tablet (10 mg) by mouth every 6 hours if needed for moderate pain (4 - 6) for up to 5 days. 20 tablet 7/2/2024 7/7/2024 Jose G Moeller PA-C    lidocaine (Lidoderm) 5 % patch Place 1 patch over 12 hours on the skin once daily. Remove & discard patch within 12 hours or as directed by MD. 7 patch 7/2/2024 -- Jose G Moeller PA-C            Disclaimer: This note was dictated by speech recognition. Minor errors in transcription may be present. Please call " if questions.       Jose G Moeller PA-C  07/02/24 [id number]

## 2024-07-10 ENCOUNTER — APPOINTMENT (OUTPATIENT)
Dept: PRIMARY CARE | Facility: CLINIC | Age: 44
End: 2024-07-10
Payer: COMMERCIAL

## 2024-07-10 VITALS
HEART RATE: 75 BPM | WEIGHT: 221 LBS | BODY MASS INDEX: 28.36 KG/M2 | RESPIRATION RATE: 14 BRPM | HEIGHT: 74 IN | DIASTOLIC BLOOD PRESSURE: 81 MMHG | OXYGEN SATURATION: 98 % | SYSTOLIC BLOOD PRESSURE: 122 MMHG | TEMPERATURE: 97.5 F

## 2024-07-10 DIAGNOSIS — S22.42XD CLOSED FRACTURE OF MULTIPLE RIBS OF LEFT SIDE WITH ROUTINE HEALING: ICD-10-CM

## 2024-07-10 DIAGNOSIS — F10.11 ALCOHOL ABUSE, IN REMISSION: ICD-10-CM

## 2024-07-10 DIAGNOSIS — F32.1 CURRENT MODERATE EPISODE OF MAJOR DEPRESSIVE DISORDER WITHOUT PRIOR EPISODE (MULTI): Primary | ICD-10-CM

## 2024-07-10 DIAGNOSIS — F41.9 ANXIETY: ICD-10-CM

## 2024-07-10 PROCEDURE — 1036F TOBACCO NON-USER: CPT | Performed by: FAMILY MEDICINE

## 2024-07-10 PROCEDURE — 99213 OFFICE O/P EST LOW 20 MIN: CPT | Performed by: FAMILY MEDICINE

## 2024-07-10 RX ORDER — HYDROXYZINE PAMOATE 50 MG/1
50 CAPSULE ORAL 4 TIMES DAILY PRN
Qty: 30 CAPSULE | Refills: 2 | Status: SHIPPED | OUTPATIENT
Start: 2024-07-10 | End: 2024-10-08

## 2024-07-10 ASSESSMENT — ENCOUNTER SYMPTOMS
PALPITATIONS: 0
ARTHRALGIAS: 0
CONFUSION: 0
CHILLS: 0
ABDOMINAL PAIN: 0
SHORTNESS OF BREATH: 0
CHEST TIGHTNESS: 0
FEVER: 0

## 2024-07-10 NOTE — PROGRESS NOTES
"Subjective   Patient ID: Dakotah Pichardo is a 43 y.o. male who presents for Follow-up (1 month/ER 2 weeks ago).    HPI patient today for follow-up overall says he has been doing well with regards to his sobriety.  He goes to individual weekly counseling sessions.  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  He is trying to stay active.  He did unfortunately break to left ribs when he collided with his son playing [employer].  He says that maxwell appear to be slowly healing as the pain is gradually lessening.    Review of Systems   Constitutional:  Negative for chills and fever.   HENT:  Negative for congestion and ear pain.    Eyes:  Negative for visual disturbance.   Respiratory:  Negative for chest tightness and shortness of breath.    Cardiovascular:  Negative for chest pain and palpitations.   Gastrointestinal:  Negative for abdominal pain.   Musculoskeletal:  Negative for arthralgias.        Left rib pain   Skin:  Negative for pallor.   Psychiatric/Behavioral:  Negative for confusion.        Objective   /81   Pulse 75   Temp 36.4 °C (97.5 °F)   Resp 14   Ht 1.88 m (6' 2\")   Wt 100 kg (221 lb)   SpO2 98%   BMI 28.37 kg/m²     Physical Exam  Constitutional:       Appearance: Normal appearance.   HENT:      Head: Normocephalic and atraumatic.   Cardiovascular:      Rate and Rhythm: Normal rate and regular rhythm.      Pulses: Normal pulses.      Heart sounds: Normal heart sounds.   Pulmonary:      Effort: Pulmonary effort is normal. No respiratory distress.      Breath sounds: Normal breath sounds.   Abdominal:      General: Abdomen is flat. Bowel sounds are normal.      Tenderness: There is no abdominal tenderness.   Musculoskeletal:         General: Tenderness present. No deformity.      Cervical back: Normal range of motion and neck supple. No tenderness.      Comments: Tenderness palpation over left mid anterior lateral rib cage     X-ray of the left ribs " reviewed showing left sixth and seventh rib fractures.  Reviewed routine healing.  Gradually increase level of activity.    Refill Vistaril 50 mg as directed as needed anxiety    Continue weekly counseling sessions  Return to our office in approximately 1 month with fasting lab work and reassessment of his case      Assessment/Plan   Problem List Items Addressed This Visit             ICD-10-CM    Alcohol abuse, in remission F10.11     Remains in remission.  Patient goes to weekly individual counseling sessions.  He says sober life so far has been going well.         Current moderate episode of major depressive disorder without prior episode (Multi) - Primary F32.1     Currently stable continue current medication and continue counseling.         Anxiety F41.9     Stable refill Vistaril which she only takes on an as-needed basis and he is no longer using the clonidine.         Closed fracture of multiple ribs of left side with routine healing S22.42XD     X-ray from ER reviewed.  We discussed routine healing activity modification and anticipate gradual improvement in symptoms.

## 2024-07-10 NOTE — ASSESSMENT & PLAN NOTE
Remains in remission.  Patient goes to weekly individual counseling sessions.  He says sober life so far has been going well.

## 2024-07-10 NOTE — ASSESSMENT & PLAN NOTE
Stable refill Vistaril which she only takes on an as-needed basis and he is no longer using the clonidine.

## 2024-07-10 NOTE — ASSESSMENT & PLAN NOTE
X-ray from ER reviewed.  We discussed routine healing activity modification and anticipate gradual improvement in symptoms.

## 2024-08-08 ENCOUNTER — APPOINTMENT (OUTPATIENT)
Dept: PRIMARY CARE | Facility: CLINIC | Age: 44
End: 2024-08-08
Payer: COMMERCIAL

## 2024-08-08 VITALS
DIASTOLIC BLOOD PRESSURE: 76 MMHG | BODY MASS INDEX: 28.75 KG/M2 | HEIGHT: 74 IN | HEART RATE: 74 BPM | SYSTOLIC BLOOD PRESSURE: 120 MMHG | RESPIRATION RATE: 14 BRPM | OXYGEN SATURATION: 99 % | TEMPERATURE: 97.2 F | WEIGHT: 224 LBS

## 2024-08-08 DIAGNOSIS — L23.7 PLANT ALLERGIC CONTACT DERMATITIS: ICD-10-CM

## 2024-08-08 DIAGNOSIS — F32.1 CURRENT MODERATE EPISODE OF MAJOR DEPRESSIVE DISORDER WITHOUT PRIOR EPISODE (MULTI): ICD-10-CM

## 2024-08-08 DIAGNOSIS — F41.9 ANXIETY: ICD-10-CM

## 2024-08-08 DIAGNOSIS — E78.1 ESSENTIAL HYPERTRIGLYCERIDEMIA: ICD-10-CM

## 2024-08-08 DIAGNOSIS — Z00.00 ANNUAL PHYSICAL EXAM: Primary | ICD-10-CM

## 2024-08-08 DIAGNOSIS — R73.01 IMPAIRED FASTING GLUCOSE: ICD-10-CM

## 2024-08-08 DIAGNOSIS — F10.11 ALCOHOL ABUSE, IN REMISSION: ICD-10-CM

## 2024-08-08 DIAGNOSIS — S22.42XD CLOSED FRACTURE OF MULTIPLE RIBS OF LEFT SIDE WITH ROUTINE HEALING: ICD-10-CM

## 2024-08-08 PROCEDURE — 99396 PREV VISIT EST AGE 40-64: CPT | Performed by: FAMILY MEDICINE

## 2024-08-08 PROCEDURE — 3008F BODY MASS INDEX DOCD: CPT | Performed by: FAMILY MEDICINE

## 2024-08-08 PROCEDURE — 1036F TOBACCO NON-USER: CPT | Performed by: FAMILY MEDICINE

## 2024-08-08 PROCEDURE — 99213 OFFICE O/P EST LOW 20 MIN: CPT | Performed by: FAMILY MEDICINE

## 2024-08-08 RX ORDER — TRIAMCINOLONE ACETONIDE 1 MG/G
CREAM TOPICAL 2 TIMES DAILY
Qty: 80 G | Refills: 0 | Status: SHIPPED | OUTPATIENT
Start: 2024-08-08

## 2024-08-08 RX ORDER — PREDNISONE 10 MG/1
TABLET ORAL
Qty: 30 TABLET | Refills: 0 | Status: SHIPPED | OUTPATIENT
Start: 2024-08-08 | End: 2024-08-18

## 2024-08-08 ASSESSMENT — COLUMBIA-SUICIDE SEVERITY RATING SCALE - C-SSRS
1. IN THE PAST MONTH, HAVE YOU WISHED YOU WERE DEAD OR WISHED YOU COULD GO TO SLEEP AND NOT WAKE UP?: NO
2. HAVE YOU ACTUALLY HAD ANY THOUGHTS OF KILLING YOURSELF?: NO
6. HAVE YOU EVER DONE ANYTHING, STARTED TO DO ANYTHING, OR PREPARED TO DO ANYTHING TO END YOUR LIFE?: NO

## 2024-08-08 ASSESSMENT — ENCOUNTER SYMPTOMS
FEVER: 0
CONFUSION: 0
PALPITATIONS: 0
ABDOMINAL PAIN: 0
CHILLS: 0
SHORTNESS OF BREATH: 0
ARTHRALGIAS: 0
CHEST TIGHTNESS: 0

## 2024-08-08 ASSESSMENT — PATIENT HEALTH QUESTIONNAIRE - PHQ9
SUM OF ALL RESPONSES TO PHQ9 QUESTIONS 1 AND 2: 2
2. FEELING DOWN, DEPRESSED OR HOPELESS: SEVERAL DAYS
10. IF YOU CHECKED OFF ANY PROBLEMS, HOW DIFFICULT HAVE THESE PROBLEMS MADE IT FOR YOU TO DO YOUR WORK, TAKE CARE OF THINGS AT HOME, OR GET ALONG WITH OTHER PEOPLE: SOMEWHAT DIFFICULT
1. LITTLE INTEREST OR PLEASURE IN DOING THINGS: SEVERAL DAYS

## 2024-08-08 NOTE — PROGRESS NOTES
"Subjective   Patient ID: Dakotah Pichardo is a 43 y.o. male who presents for Annual Exam (4 month) and Rash (Poison ivy?).    Rash  Pertinent negatives include no congestion, fever or shortness of breath.    patient today for follow-up did not get any of his lab work done.  Is complaining of a pruritic rash on both lower extremities just above the ankles as well as along his right anterior waistline it itches.  Been there over the past few days.    Review of Systems   Constitutional:  Negative for chills and fever.   HENT:  Negative for congestion and ear pain.    Eyes:  Negative for visual disturbance.   Respiratory:  Negative for chest tightness and shortness of breath.    Cardiovascular:  Negative for chest pain and palpitations.   Gastrointestinal:  Negative for abdominal pain.   Musculoskeletal:  Negative for arthralgias.   Skin:  Positive for rash. Negative for pallor.   Psychiatric/Behavioral:  Negative for confusion.        Objective   /76   Pulse 74   Temp 36.2 °C (97.2 °F)   Resp 14   Ht 1.88 m (6' 2\")   Wt 102 kg (224 lb)   SpO2 99%   BMI 28.76 kg/m²     Physical Exam  Vitals and nursing note reviewed.   Constitutional:       General: He is not in acute distress.     Appearance: Normal appearance. He is not ill-appearing.   HENT:      Head: Normocephalic and atraumatic.      Right Ear: Tympanic membrane, ear canal and external ear normal.      Left Ear: Tympanic membrane, ear canal and external ear normal.      Mouth/Throat:      Pharynx: Oropharynx is clear.   Eyes:      Extraocular Movements: Extraocular movements intact.   Cardiovascular:      Rate and Rhythm: Normal rate and regular rhythm.      Pulses: Normal pulses.      Heart sounds: Normal heart sounds.   Pulmonary:      Effort: Pulmonary effort is normal.      Breath sounds: Normal breath sounds.   Abdominal:      General: Abdomen is flat. Bowel sounds are normal.      Palpations: Abdomen is soft.      Tenderness: There is no " abdominal tenderness.   Musculoskeletal:         General: No tenderness. Normal range of motion.      Cervical back: Neck supple.   Skin:     General: Skin is warm.      Findings: Rash present.      Comments: Papulovesicular rash noted along the right anterior lateral abdominal wall just above the waistline similar patches noted on lower extremities just above the ankles bilaterally right side greater than left side.   Neurological:      Mental Status: He is alert and oriented to person, place, and time. Mental status is at baseline.   Psychiatric:         Mood and Affect: Mood normal.     Patient encouraged to get lab work completed to call for result    Clinically suspect plant dermatitis such as poison ivy start prednisone 10 mg taper as directed Kenalog cream 0.1% as directed  Call if anything worsens or if rash is not resolved in 10 days    Continue counseling along with current medication    Patient states he continues to abstain from alcohol    Return to office 3 months with repeat fasting lab    Assessment/Plan   Problem List Items Addressed This Visit             ICD-10-CM    Essential hypertriglyceridemia E78.1     Check fasting lipid         Relevant Orders    Follow Up In Primary Care - Established    Comprehensive Metabolic Panel    Lipid Panel    Alcohol abuse, in remission F10.11     Remains in remission per patient report         Impaired fasting glucose R73.01     Continue to monitor fasting blood sugar and A1c follow diabetic diet         Relevant Orders    Follow Up In Primary Care - Established    Comprehensive Metabolic Panel    Hemoglobin A1C    Current moderate episode of major depressive disorder without prior episode (Multi) F32.1     Continue current medication along with counseling         Anxiety F41.9     Continue current medications along with counseling         Annual physical exam - Primary Z00.00     Patient encouraged to get blood work done this month call for results.    Continue  to go for counseling along with current medications.         Closed fracture of multiple ribs of left side with routine healing S22.42XD     Clinically is resolved.  Insurance paperwork completed per patient request         Plant allergic contact dermatitis L23.7     Prednisone 10 mg taper  Kenalog 0.1% cream as directed  Anticipate resolution of the rash over the next 10 days call if anything worsens.  Or if rash not resolving         Relevant Medications    predniSONE (Deltasone) 10 mg tablet    triamcinolone (Kenalog) 0.1 % cream

## 2024-08-08 NOTE — ASSESSMENT & PLAN NOTE
Patient encouraged to get blood work done this month call for results.    Continue to go for counseling along with current medications.

## 2024-08-08 NOTE — ASSESSMENT & PLAN NOTE
Prednisone 10 mg taper  Kenalog 0.1% cream as directed  Anticipate resolution of the rash over the next 10 days call if anything worsens.  Or if rash not resolving

## 2024-08-14 ENCOUNTER — APPOINTMENT (OUTPATIENT)
Dept: PRIMARY CARE | Facility: CLINIC | Age: 44
End: 2024-08-14
Payer: COMMERCIAL

## 2024-08-18 DIAGNOSIS — F41.9 ANXIETY: ICD-10-CM

## 2024-08-19 RX ORDER — HYDROXYZINE PAMOATE 50 MG/1
50 CAPSULE ORAL 4 TIMES DAILY PRN
Qty: 30 CAPSULE | Refills: 2 | OUTPATIENT
Start: 2024-08-19 | End: 2024-11-17

## 2024-08-27 ENCOUNTER — LAB (OUTPATIENT)
Dept: LAB | Facility: LAB | Age: 44
End: 2024-08-27
Payer: COMMERCIAL

## 2024-08-27 DIAGNOSIS — E55.9 VITAMIN D DEFICIENCY: ICD-10-CM

## 2024-08-27 DIAGNOSIS — E78.1 ESSENTIAL HYPERTRIGLYCERIDEMIA: ICD-10-CM

## 2024-08-27 DIAGNOSIS — R73.01 IMPAIRED FASTING GLUCOSE: ICD-10-CM

## 2024-08-27 LAB
25(OH)D3 SERPL-MCNC: 34 NG/ML (ref 30–100)
ALBUMIN SERPL BCP-MCNC: 3.9 G/DL (ref 3.4–5)
ALP SERPL-CCNC: 66 U/L (ref 33–120)
ALT SERPL W P-5'-P-CCNC: 28 U/L (ref 10–52)
ANION GAP SERPL CALC-SCNC: 10 MMOL/L (ref 10–20)
AST SERPL W P-5'-P-CCNC: 21 U/L (ref 9–39)
BILIRUB SERPL-MCNC: 0.4 MG/DL (ref 0–1.2)
BUN SERPL-MCNC: 13 MG/DL (ref 6–23)
CALCIUM SERPL-MCNC: 8.8 MG/DL (ref 8.6–10.3)
CHLORIDE SERPL-SCNC: 103 MMOL/L (ref 98–107)
CHOLEST SERPL-MCNC: 194 MG/DL (ref 0–199)
CHOLESTEROL/HDL RATIO: 4.8
CO2 SERPL-SCNC: 26 MMOL/L (ref 21–32)
CREAT SERPL-MCNC: 1.42 MG/DL (ref 0.5–1.3)
EGFRCR SERPLBLD CKD-EPI 2021: 63 ML/MIN/1.73M*2
GLUCOSE SERPL-MCNC: 79 MG/DL (ref 74–99)
HDLC SERPL-MCNC: 40.6 MG/DL
LDLC SERPL CALC-MCNC: 96 MG/DL
NON HDL CHOLESTEROL: 153 MG/DL (ref 0–149)
POTASSIUM SERPL-SCNC: 3.7 MMOL/L (ref 3.5–5.3)
PROT SERPL-MCNC: 6.5 G/DL (ref 6.4–8.2)
SODIUM SERPL-SCNC: 135 MMOL/L (ref 136–145)
TRIGL SERPL-MCNC: 286 MG/DL (ref 0–149)
VLDL: 57 MG/DL (ref 0–40)

## 2024-08-27 PROCEDURE — 36415 COLL VENOUS BLD VENIPUNCTURE: CPT

## 2024-08-27 PROCEDURE — 82306 VITAMIN D 25 HYDROXY: CPT

## 2024-08-27 PROCEDURE — 80053 COMPREHEN METABOLIC PANEL: CPT

## 2024-08-27 PROCEDURE — 83036 HEMOGLOBIN GLYCOSYLATED A1C: CPT

## 2024-08-27 PROCEDURE — 80061 LIPID PANEL: CPT

## 2024-08-27 RX ORDER — HYDROXYZINE PAMOATE 50 MG/1
50 CAPSULE ORAL 4 TIMES DAILY PRN
Qty: 90 CAPSULE | Refills: 0 | Status: SHIPPED | OUTPATIENT
Start: 2024-08-27 | End: 2024-11-25

## 2024-08-28 LAB
EST. AVERAGE GLUCOSE BLD GHB EST-MCNC: 123 MG/DL
HBA1C MFR BLD: 5.9 %

## 2024-09-23 ENCOUNTER — TELEPHONE (OUTPATIENT)
Dept: PRIMARY CARE | Facility: CLINIC | Age: 44
End: 2024-09-23
Payer: COMMERCIAL

## 2024-09-23 DIAGNOSIS — F41.9 ANXIETY: ICD-10-CM

## 2024-09-23 RX ORDER — HYDROXYZINE PAMOATE 50 MG/1
50 CAPSULE ORAL 4 TIMES DAILY PRN
Qty: 90 CAPSULE | Refills: 1 | Status: SHIPPED | OUTPATIENT
Start: 2024-09-23 | End: 2024-12-22

## 2024-09-23 NOTE — TELEPHONE ENCOUNTER
Rx Refill Request Telephone Encounter    Name:  Dakotah Pichardo  :  [id number]  Medication Name:      hydrOXYzine pamoate (Vistaril) 50 mg capsule  1 capsule taken by mouth 4 times a day as needed for anxiety                  Specific Pharmacy location:   [facility]  Date of last appointment:  2024  Date of next appointment:  2024  Best number to reach patient:  [phone]             Opt out

## 2024-10-12 DIAGNOSIS — F10.11 ALCOHOL ABUSE, IN REMISSION: ICD-10-CM

## 2024-10-12 DIAGNOSIS — F41.9 ANXIETY: ICD-10-CM

## 2024-10-12 DIAGNOSIS — F32.1 CURRENT MODERATE EPISODE OF MAJOR DEPRESSIVE DISORDER WITHOUT PRIOR EPISODE (MULTI): ICD-10-CM

## 2024-10-21 ENCOUNTER — TELEPHONE (OUTPATIENT)
Dept: PRIMARY CARE | Facility: CLINIC | Age: 44
End: 2024-10-21
Payer: COMMERCIAL

## 2024-10-21 DIAGNOSIS — F41.9 ANXIETY: ICD-10-CM

## 2024-10-21 DIAGNOSIS — F32.1 CURRENT MODERATE EPISODE OF MAJOR DEPRESSIVE DISORDER WITHOUT PRIOR EPISODE (MULTI): ICD-10-CM

## 2024-10-21 DIAGNOSIS — F10.11 ALCOHOL ABUSE, IN REMISSION: ICD-10-CM

## 2024-10-21 RX ORDER — PRAMIPEXOLE DIHYDROCHLORIDE 0.12 MG/1
0.12 TABLET ORAL 3 TIMES DAILY
Qty: 90 TABLET | Refills: 3 | OUTPATIENT
Start: 2024-10-21

## 2024-10-21 RX ORDER — PRAZOSIN HYDROCHLORIDE 1 MG/1
1 CAPSULE ORAL NIGHTLY
Qty: 30 CAPSULE | Refills: 5 | Status: SHIPPED | OUTPATIENT
Start: 2024-10-21 | End: 2025-04-19

## 2024-10-21 RX ORDER — SERTRALINE HYDROCHLORIDE 100 MG/1
100 TABLET, FILM COATED ORAL DAILY
Qty: 30 TABLET | Refills: 5 | Status: SHIPPED | OUTPATIENT
Start: 2024-10-21 | End: 2025-04-19

## 2024-10-21 RX ORDER — PRAMIPEXOLE DIHYDROCHLORIDE 0.12 MG/1
0.12 TABLET ORAL 3 TIMES DAILY
Qty: 90 TABLET | Refills: 5 | Status: SHIPPED | OUTPATIENT
Start: 2024-10-21 | End: 2025-04-19

## 2024-10-21 RX ORDER — QUETIAPINE FUMARATE 50 MG/1
50 TABLET, FILM COATED ORAL NIGHTLY
Qty: 30 TABLET | Refills: 5 | Status: SHIPPED | OUTPATIENT
Start: 2024-10-21 | End: 2025-04-19

## 2024-10-21 RX ORDER — BUSPIRONE HYDROCHLORIDE 10 MG/1
10 TABLET ORAL 3 TIMES DAILY
Qty: 90 TABLET | Refills: 5 | Status: SHIPPED | OUTPATIENT
Start: 2024-10-21 | End: 2025-04-19

## 2024-10-21 NOTE — TELEPHONE ENCOUNTER
Rx Refill Request Telephone Encounter    Name:  Dakotah Pichardo  :  [id number]  Medication Name:      prazosin (Minipress) 1 mg capsule   1 cap taken daily at bedtime    pramipexole (Mirapex) 0.125 mg tablet  1 tab taken 3 times a day    busPIRone (Buspar) 10 mg tablet  1 tab taken 3 times a day     QUEtiapine (SEROquel) 50 mg tablet  1 tab taken daily at bedtime    sertraline (Zoloft) 100 mg tablet  1 tab taken once daily                      Specific Pharmacy location:   [facility]  Date of last appointment:  2024  Date of next appointment:  2024  Best number to reach patient:  [phone]

## 2024-11-18 ENCOUNTER — APPOINTMENT (OUTPATIENT)
Dept: PRIMARY CARE | Facility: CLINIC | Age: 44
End: 2024-11-18
Payer: COMMERCIAL

## 2024-11-18 VITALS
WEIGHT: 228 LBS | SYSTOLIC BLOOD PRESSURE: 116 MMHG | DIASTOLIC BLOOD PRESSURE: 81 MMHG | TEMPERATURE: 97.5 F | RESPIRATION RATE: 14 BRPM | HEART RATE: 66 BPM | BODY MASS INDEX: 29.27 KG/M2 | OXYGEN SATURATION: 98 %

## 2024-11-18 DIAGNOSIS — F32.1 CURRENT MODERATE EPISODE OF MAJOR DEPRESSIVE DISORDER WITHOUT PRIOR EPISODE (MULTI): Primary | ICD-10-CM

## 2024-11-18 DIAGNOSIS — F41.9 ANXIETY: ICD-10-CM

## 2024-11-18 DIAGNOSIS — F10.11 ALCOHOL ABUSE, IN REMISSION: ICD-10-CM

## 2024-11-18 DIAGNOSIS — E55.9 VITAMIN D DEFICIENCY: ICD-10-CM

## 2024-11-18 DIAGNOSIS — G47.09 OTHER INSOMNIA: ICD-10-CM

## 2024-11-18 DIAGNOSIS — E78.1 ESSENTIAL HYPERTRIGLYCERIDEMIA: ICD-10-CM

## 2024-11-18 DIAGNOSIS — Z13.29 THYROID DISORDER SCREEN: ICD-10-CM

## 2024-11-18 DIAGNOSIS — R73.01 IMPAIRED FASTING GLUCOSE: ICD-10-CM

## 2024-11-18 DIAGNOSIS — G25.81 RESTLESS LEG SYNDROME: ICD-10-CM

## 2024-11-18 DIAGNOSIS — R12 HEARTBURN: ICD-10-CM

## 2024-11-18 PROBLEM — S22.42XD CLOSED FRACTURE OF MULTIPLE RIBS OF LEFT SIDE WITH ROUTINE HEALING: Status: RESOLVED | Noted: 2024-07-10 | Resolved: 2024-11-18

## 2024-11-18 PROBLEM — L23.7 PLANT ALLERGIC CONTACT DERMATITIS: Status: RESOLVED | Noted: 2024-08-08 | Resolved: 2024-11-18

## 2024-11-18 PROBLEM — E87.6 HYPOKALEMIA: Status: RESOLVED | Noted: 2023-04-06 | Resolved: 2024-11-18

## 2024-11-18 PROCEDURE — 99214 OFFICE O/P EST MOD 30 MIN: CPT | Performed by: FAMILY MEDICINE

## 2024-11-18 PROCEDURE — 1036F TOBACCO NON-USER: CPT | Performed by: FAMILY MEDICINE

## 2024-11-18 RX ORDER — PRAMIPEXOLE DIHYDROCHLORIDE 0.12 MG/1
0.12 TABLET ORAL 3 TIMES DAILY
Qty: 90 TABLET | Refills: 5 | Status: SHIPPED | OUTPATIENT
Start: 2024-11-18 | End: 2025-05-17

## 2024-11-18 RX ORDER — QUETIAPINE FUMARATE 100 MG/1
100 TABLET, FILM COATED ORAL NIGHTLY
Qty: 30 TABLET | Refills: 5 | Status: SHIPPED | OUTPATIENT
Start: 2024-11-18 | End: 2025-05-17

## 2024-11-18 RX ORDER — HYDROXYZINE PAMOATE 50 MG/1
50 CAPSULE ORAL 4 TIMES DAILY PRN
Qty: 90 CAPSULE | Refills: 3 | Status: SHIPPED | OUTPATIENT
Start: 2024-11-18 | End: 2025-02-16

## 2024-11-18 RX ORDER — ALUMINUM HYDROXIDE, MAGNESIUM HYDROXIDE, AND SIMETHICONE 1200; 120; 1200 MG/30ML; MG/30ML; MG/30ML
10 SUSPENSION ORAL EVERY 6 HOURS PRN
Qty: 355 ML | Refills: 2 | Status: SHIPPED | OUTPATIENT
Start: 2024-11-18

## 2024-11-18 RX ORDER — RISPERIDONE 1 MG/ML
10 SOLUTION ORAL EVERY 6 HOURS PRN
COMMUNITY
Start: 2024-04-30 | End: 2024-11-18 | Stop reason: SDUPTHER

## 2024-11-18 ASSESSMENT — ENCOUNTER SYMPTOMS
PALPITATIONS: 0
FEVER: 0
CHEST TIGHTNESS: 0
ARTHRALGIAS: 0
ABDOMINAL PAIN: 0
CONFUSION: 0
SHORTNESS OF BREATH: 0
CHILLS: 0

## 2024-11-18 NOTE — ASSESSMENT & PLAN NOTE
Stable continue Zoloft 100 mg daily he is also on Seroquel which we will increase to 100 mg at bedtime using it more for sleep

## 2024-11-18 NOTE — PROGRESS NOTES
Subjective   Patient ID: Dakotah Pichardo is a 44 y.o. male who presents for Follow-up (3 month ).    HPI patient today for follow-up for the most part says he is doing okay needs a few of his medicines refilled.  He does not feel the 50 mg dose of the Seroquel is quite enough as he has been having some difficulties with sleeping.  Says he was without his Campral for about 3 to 4 months due to lack of availability at the pharmacy he feels he is done well without it and does not really feel the need to go back on it or anything similar he continues with his counseling weekly or at least every other week.    Review of Systems   Constitutional:  Negative for chills and fever.   HENT:  Negative for congestion and ear pain.    Eyes:  Negative for visual disturbance.   Respiratory:  Negative for chest tightness and shortness of breath.    Cardiovascular:  Negative for chest pain and palpitations.   Gastrointestinal:  Negative for abdominal pain.   Musculoskeletal:  Negative for arthralgias.   Skin:  Negative for pallor.   Psychiatric/Behavioral:  Negative for confusion.        Objective   /81   Pulse 66   Temp 36.4 °C (97.5 °F)   Resp 14   Wt 103 kg (228 lb)   SpO2 98%   BMI 29.27 kg/m²     Physical Exam  Vitals and nursing note reviewed.   Constitutional:       General: He is not in acute distress.     Appearance: Normal appearance. He is not ill-appearing.   HENT:      Head: Normocephalic and atraumatic.      Right Ear: Tympanic membrane, ear canal and external ear normal.      Left Ear: Tympanic membrane, ear canal and external ear normal.      Mouth/Throat:      Pharynx: Oropharynx is clear.   Eyes:      Extraocular Movements: Extraocular movements intact.   Cardiovascular:      Rate and Rhythm: Normal rate and regular rhythm.      Pulses: Normal pulses.      Heart sounds: Normal heart sounds.   Pulmonary:      Effort: Pulmonary effort is normal.      Breath sounds: Normal breath sounds.   Abdominal:       General: Abdomen is flat. Bowel sounds are normal.      Palpations: Abdomen is soft.      Tenderness: There is no abdominal tenderness.   Musculoskeletal:         General: Normal range of motion.      Cervical back: Neck supple.   Skin:     General: Skin is warm.   Neurological:      Mental Status: He is alert and oriented to person, place, and time. Mental status is at baseline.   Psychiatric:         Mood and Affect: Mood normal.     No results found for this or any previous visit (from the past 6 weeks).    Patient will get fasting lab work done this week call for results  Necessary refills provided    Increase Seroquel to 100 mg nightly    Continue counseling on a regular basis    He declines flu vaccine    Return to office 4 months with repeat fasting labs      Assessment/Plan   Problem List Items Addressed This Visit             ICD-10-CM    Essential hypertriglyceridemia E78.1     Check fasting lipid and continue dietary modification         Relevant Orders    Comprehensive Metabolic Panel    Lipid Panel    Follow Up In Primary Care - Established    Vitamin D deficiency E55.9     Continue to monitor and supplement         Relevant Orders    Vitamin D 25-Hydroxy,Total (for eval of Vitamin D levels)    Alcohol abuse, in remission F10.11     Patient states he could not feel the Campral due to lack of availability has been without it for about 4 months but says he seems like he is doing well off of it does not feel the need to go back on it or anything similar he continues with individual counseling on a regular basis.         Relevant Medications    pramipexole (Mirapex) 0.125 mg tablet    Other Relevant Orders    CBC    Comprehensive Metabolic Panel    Follow Up In Primary Care - Established    Impaired fasting glucose R73.01     Dietary modification check fasting blood sugar and A1c         Relevant Orders    CBC    Comprehensive Metabolic Panel    Hemoglobin A1C    Follow Up In Primary Care - Established     Current moderate episode of major depressive disorder without prior episode (Multi) - Primary F32.1     Stable continue Zoloft 100 mg daily he is also on Seroquel which we will increase to 100 mg at bedtime using it more for sleep         Relevant Medications    QUEtiapine (SeroqueL) 100 mg tablet    pramipexole (Mirapex) 0.125 mg tablet    Other Relevant Orders    CBC    Comprehensive Metabolic Panel    Follow Up In Primary Care - Established    Anxiety F41.9     Refill Vistaril 50 mg as directed continue BuSpar 10 mg 3 times daily and Zoloft 100 mg daily         Relevant Medications    hydrOXYzine pamoate (Vistaril) 50 mg capsule    pramipexole (Mirapex) 0.125 mg tablet    Other Relevant Orders    CBC    Follow Up In Primary Care - Established    Thyroid disorder screen Z13.29     TSH with reflex         Relevant Orders    TSH with reflex to Free T4 if abnormal    Other insomnia G47.09     Increase Seroquel to 100 mg nightly         Relevant Medications    QUEtiapine (SeroqueL) 100 mg tablet    Other Relevant Orders    Follow Up In Primary Care - Established    Restless leg syndrome G25.81     Will Mirapex 0.125 mg 3 times daily         Relevant Orders    Follow Up In Primary Care - Established     Other Visit Diagnoses         Codes    Heartburn     R12    Relevant Medications    alum-mag hydroxide-simeth (Elizabeth-Lanta) 200-200-20 mg/5 mL oral suspension

## 2024-11-18 NOTE — ASSESSMENT & PLAN NOTE
Patient states he could not feel the Campral due to lack of availability has been without it for about 4 months but says he seems like he is doing well off of it does not feel the need to go back on it or anything similar he continues with individual counseling on a regular basis.

## 2025-03-18 ENCOUNTER — APPOINTMENT (OUTPATIENT)
Dept: PRIMARY CARE | Facility: CLINIC | Age: 45
End: 2025-03-18
Payer: COMMERCIAL

## 2025-04-06 LAB
25(OH)D3+25(OH)D2 SERPL-MCNC: 30 NG/ML (ref 30–100)
ALBUMIN SERPL-MCNC: 4.4 G/DL (ref 3.6–5.1)
ALP SERPL-CCNC: 71 U/L (ref 36–130)
ALT SERPL-CCNC: 35 U/L (ref 9–46)
ANION GAP SERPL CALCULATED.4IONS-SCNC: 8 MMOL/L (CALC) (ref 7–17)
AST SERPL-CCNC: 21 U/L (ref 10–40)
BILIRUB SERPL-MCNC: 0.3 MG/DL (ref 0.2–1.2)
BUN SERPL-MCNC: 18 MG/DL (ref 7–25)
CALCIUM SERPL-MCNC: 9.5 MG/DL (ref 8.6–10.3)
CHLORIDE SERPL-SCNC: 104 MMOL/L (ref 98–110)
CHOLEST SERPL-MCNC: 206 MG/DL
CHOLEST/HDLC SERPL: 4.9 (CALC)
CO2 SERPL-SCNC: 26 MMOL/L (ref 20–32)
CREAT SERPL-MCNC: 1.28 MG/DL (ref 0.6–1.29)
EGFRCR SERPLBLD CKD-EPI 2021: 71 ML/MIN/1.73M2
ERYTHROCYTE [DISTWIDTH] IN BLOOD BY AUTOMATED COUNT: NORMAL %
EST. AVERAGE GLUCOSE BLD GHB EST-MCNC: 117 MG/DL
EST. AVERAGE GLUCOSE BLD GHB EST-SCNC: 6.5 MMOL/L
GLUCOSE SERPL-MCNC: 104 MG/DL (ref 65–99)
HBA1C MFR BLD: 5.7 % OF TOTAL HGB
HCT VFR BLD AUTO: NORMAL %
HDLC SERPL-MCNC: 42 MG/DL
HGB BLD-MCNC: NORMAL G/DL
LDLC SERPL CALC-MCNC: 122 MG/DL (CALC)
MCH RBC QN AUTO: NORMAL PG
MCHC RBC AUTO-ENTMCNC: NORMAL G/DL
MCV RBC AUTO: NORMAL FL
NONHDLC SERPL-MCNC: 164 MG/DL (CALC)
PLATELET # BLD AUTO: NORMAL 10*3/UL
PMV BLD REES-ECKER: NORMAL FL
POTASSIUM SERPL-SCNC: 4.5 MMOL/L (ref 3.5–5.3)
PROT SERPL-MCNC: 7 G/DL (ref 6.1–8.1)
RBC # BLD AUTO: NORMAL 10*6/UL
SODIUM SERPL-SCNC: 138 MMOL/L (ref 135–146)
TRIGL SERPL-MCNC: 275 MG/DL
TSH SERPL-ACNC: 2.56 MIU/L (ref 0.4–4.5)
WBC # BLD AUTO: NORMAL 10*3/UL

## 2025-04-07 LAB
25(OH)D3+25(OH)D2 SERPL-MCNC: 30 NG/ML (ref 30–100)
ALBUMIN SERPL-MCNC: 4.4 G/DL (ref 3.6–5.1)
ALP SERPL-CCNC: 71 U/L (ref 36–130)
ALT SERPL-CCNC: 35 U/L (ref 9–46)
ANION GAP SERPL CALCULATED.4IONS-SCNC: 8 MMOL/L (CALC) (ref 7–17)
AST SERPL-CCNC: 21 U/L (ref 10–40)
BILIRUB SERPL-MCNC: 0.3 MG/DL (ref 0.2–1.2)
BUN SERPL-MCNC: 18 MG/DL (ref 7–25)
CALCIUM SERPL-MCNC: 9.5 MG/DL (ref 8.6–10.3)
CHLORIDE SERPL-SCNC: 104 MMOL/L (ref 98–110)
CHOLEST SERPL-MCNC: 206 MG/DL
CHOLEST/HDLC SERPL: 4.9 (CALC)
CO2 SERPL-SCNC: 26 MMOL/L (ref 20–32)
CREAT SERPL-MCNC: 1.28 MG/DL (ref 0.6–1.29)
EGFRCR SERPLBLD CKD-EPI 2021: 71 ML/MIN/1.73M2
ERYTHROCYTE [DISTWIDTH] IN BLOOD BY AUTOMATED COUNT: 13.4 % (ref 11–15)
EST. AVERAGE GLUCOSE BLD GHB EST-MCNC: 117 MG/DL
EST. AVERAGE GLUCOSE BLD GHB EST-SCNC: 6.5 MMOL/L
GLUCOSE SERPL-MCNC: 104 MG/DL (ref 65–99)
HBA1C MFR BLD: 5.7 % OF TOTAL HGB
HCT VFR BLD AUTO: 44 % (ref 38.5–50)
HDLC SERPL-MCNC: 42 MG/DL
HGB BLD-MCNC: 14.6 G/DL (ref 13.2–17.1)
LDLC SERPL CALC-MCNC: 122 MG/DL (CALC)
MCH RBC QN AUTO: 28.1 PG (ref 27–33)
MCHC RBC AUTO-ENTMCNC: 33.2 G/DL (ref 32–36)
MCV RBC AUTO: 84.8 FL (ref 80–100)
NONHDLC SERPL-MCNC: 164 MG/DL (CALC)
PLATELET # BLD AUTO: 292 THOUSAND/UL (ref 140–400)
PMV BLD REES-ECKER: 9.7 FL (ref 7.5–12.5)
POTASSIUM SERPL-SCNC: 4.5 MMOL/L (ref 3.5–5.3)
PROT SERPL-MCNC: 7 G/DL (ref 6.1–8.1)
RBC # BLD AUTO: 5.19 MILLION/UL (ref 4.2–5.8)
SODIUM SERPL-SCNC: 138 MMOL/L (ref 135–146)
TRIGL SERPL-MCNC: 275 MG/DL
TSH SERPL-ACNC: 2.56 MIU/L (ref 0.4–4.5)
WBC # BLD AUTO: 6.4 THOUSAND/UL (ref 3.8–10.8)

## 2025-04-08 ENCOUNTER — APPOINTMENT (OUTPATIENT)
Dept: PRIMARY CARE | Facility: CLINIC | Age: 45
End: 2025-04-08
Payer: COMMERCIAL

## 2025-04-08 VITALS
HEART RATE: 66 BPM | OXYGEN SATURATION: 98 % | WEIGHT: 245 LBS | RESPIRATION RATE: 14 BRPM | BODY MASS INDEX: 31.46 KG/M2 | DIASTOLIC BLOOD PRESSURE: 73 MMHG | TEMPERATURE: 97 F | SYSTOLIC BLOOD PRESSURE: 107 MMHG

## 2025-04-08 DIAGNOSIS — G25.81 RESTLESS LEG SYNDROME: ICD-10-CM

## 2025-04-08 DIAGNOSIS — F32.1 CURRENT MODERATE EPISODE OF MAJOR DEPRESSIVE DISORDER WITHOUT PRIOR EPISODE (MULTI): Primary | ICD-10-CM

## 2025-04-08 DIAGNOSIS — F41.9 ANXIETY: ICD-10-CM

## 2025-04-08 DIAGNOSIS — E78.1 ESSENTIAL HYPERTRIGLYCERIDEMIA: ICD-10-CM

## 2025-04-08 DIAGNOSIS — F10.11 ALCOHOL ABUSE, IN REMISSION: ICD-10-CM

## 2025-04-08 DIAGNOSIS — R73.01 IMPAIRED FASTING GLUCOSE: ICD-10-CM

## 2025-04-08 DIAGNOSIS — E55.9 VITAMIN D DEFICIENCY: ICD-10-CM

## 2025-04-08 DIAGNOSIS — G47.09 OTHER INSOMNIA: ICD-10-CM

## 2025-04-08 PROCEDURE — 1036F TOBACCO NON-USER: CPT | Performed by: FAMILY MEDICINE

## 2025-04-08 PROCEDURE — 99214 OFFICE O/P EST MOD 30 MIN: CPT | Performed by: FAMILY MEDICINE

## 2025-04-08 RX ORDER — PRAMIPEXOLE DIHYDROCHLORIDE 0.12 MG/1
0.12 TABLET ORAL 3 TIMES DAILY
Qty: 90 TABLET | Refills: 5 | Status: SHIPPED | OUTPATIENT
Start: 2025-04-08 | End: 2025-10-05

## 2025-04-08 RX ORDER — HYDROXYZINE PAMOATE 50 MG/1
50 CAPSULE ORAL 4 TIMES DAILY PRN
Qty: 90 CAPSULE | Refills: 3 | Status: SHIPPED | OUTPATIENT
Start: 2025-04-08 | End: 2025-07-07

## 2025-04-08 RX ORDER — PRAZOSIN HYDROCHLORIDE 1 MG/1
1 CAPSULE ORAL NIGHTLY
Qty: 30 CAPSULE | Refills: 5 | Status: SHIPPED | OUTPATIENT
Start: 2025-04-08 | End: 2025-10-05

## 2025-04-08 RX ORDER — CETIRIZINE HYDROCHLORIDE 10 MG/1
TABLET ORAL
COMMUNITY
Start: 2025-03-25 | End: 2025-04-08

## 2025-04-08 RX ORDER — QUETIAPINE FUMARATE 100 MG/1
100 TABLET, FILM COATED ORAL NIGHTLY
Qty: 30 TABLET | Refills: 5 | Status: SHIPPED | OUTPATIENT
Start: 2025-04-08 | End: 2025-10-05

## 2025-04-08 RX ORDER — BUSPIRONE HYDROCHLORIDE 10 MG/1
10 TABLET ORAL 3 TIMES DAILY
Qty: 90 TABLET | Refills: 5 | Status: SHIPPED | OUTPATIENT
Start: 2025-04-08 | End: 2025-10-05

## 2025-04-08 RX ORDER — SERTRALINE HYDROCHLORIDE 100 MG/1
100 TABLET, FILM COATED ORAL DAILY
Qty: 30 TABLET | Refills: 5 | Status: SHIPPED | OUTPATIENT
Start: 2025-04-08 | End: 2025-10-05

## 2025-04-08 RX ORDER — AZELASTINE 1 MG/ML
SPRAY, METERED NASAL
COMMUNITY
Start: 2025-03-25 | End: 2025-04-08

## 2025-04-08 RX ORDER — FLUTICASONE PROPIONATE 50 MCG
SPRAY, SUSPENSION (ML) NASAL
COMMUNITY
Start: 2025-03-25 | End: 2025-04-08

## 2025-04-08 ASSESSMENT — ENCOUNTER SYMPTOMS
FEVER: 0
CHILLS: 0
CONFUSION: 0
ARTHRALGIAS: 0
ABDOMINAL PAIN: 0
PALPITATIONS: 0
CHEST TIGHTNESS: 0
SHORTNESS OF BREATH: 0

## 2025-04-08 ASSESSMENT — PATIENT HEALTH QUESTIONNAIRE - PHQ9
SUM OF ALL RESPONSES TO PHQ9 QUESTIONS 1 AND 2: 0
2. FEELING DOWN, DEPRESSED OR HOPELESS: NOT AT ALL
1. LITTLE INTEREST OR PLEASURE IN DOING THINGS: NOT AT ALL

## 2025-04-08 NOTE — PROGRESS NOTES
Subjective   Patient ID: Dakotah Pichardo is a 44 y.o. male who presents for Follow-up (4 month).    HPI patient today for follow-up of ongoing healthcare issues and review of blood work emotionally he feels he still doing well on current medication along with going to the individual therapy on an every other week basis.  He has been able to maintain his sobriety.    Review of Systems   Constitutional:  Negative for chills and fever.   HENT:  Negative for congestion and ear pain.    Eyes:  Negative for visual disturbance.   Respiratory:  Negative for chest tightness and shortness of breath.    Cardiovascular:  Negative for chest pain and palpitations.   Gastrointestinal:  Negative for abdominal pain.   Musculoskeletal:  Negative for arthralgias.   Skin:  Negative for pallor.   Psychiatric/Behavioral:  Negative for confusion.        Objective   /73   Pulse 66   Temp 36.1 °C (97 °F)   Resp 14   Wt 111 kg (245 lb)   SpO2 98%   BMI 31.46 kg/m²     Physical Exam  Vitals and nursing note reviewed.   Constitutional:       General: He is not in acute distress.     Appearance: Normal appearance. He is not ill-appearing.   HENT:      Head: Normocephalic and atraumatic.      Right Ear: Tympanic membrane, ear canal and external ear normal.      Left Ear: Tympanic membrane, ear canal and external ear normal.      Mouth/Throat:      Pharynx: Oropharynx is clear.   Eyes:      Extraocular Movements: Extraocular movements intact.   Cardiovascular:      Rate and Rhythm: Normal rate and regular rhythm.      Pulses: Normal pulses.      Heart sounds: Normal heart sounds.   Pulmonary:      Effort: Pulmonary effort is normal.      Breath sounds: Normal breath sounds.   Abdominal:      General: Abdomen is flat. Bowel sounds are normal.      Palpations: Abdomen is soft.      Tenderness: There is no abdominal tenderness.   Musculoskeletal:         General: Normal range of motion.      Cervical back: Neck supple.   Skin:      General: Skin is warm.   Neurological:      Mental Status: He is alert and oriented to person, place, and time. Mental status is at baseline.   Psychiatric:         Mood and Affect: Mood normal.       Recent Results (from the past 6 weeks)   Lipid Panel    Collection Time: 04/05/25 10:42 AM   Result Value Ref Range    CHOLESTEROL, TOTAL 206 (H) <200 mg/dL    HDL CHOLESTEROL 42 > OR = 40 mg/dL    TRIGLYCERIDES 275 (H) <150 mg/dL    LDL-CHOLESTEROL 122 (H) mg/dL (calc)    CHOL/HDLC RATIO 4.9 <5.0 (calc)    NON HDL CHOLESTEROL 164 (H) <130 mg/dL (calc)   Comprehensive Metabolic Panel    Collection Time: 04/05/25 10:42 AM   Result Value Ref Range    GLUCOSE 104 (H) 65 - 99 mg/dL    UREA NITROGEN (BUN) 18 7 - 25 mg/dL    CREATININE 1.28 0.60 - 1.29 mg/dL    EGFR 71 > OR = 60 mL/min/1.73m2    SODIUM 138 135 - 146 mmol/L    POTASSIUM 4.5 3.5 - 5.3 mmol/L    CHLORIDE 104 98 - 110 mmol/L    CARBON DIOXIDE 26 20 - 32 mmol/L    ELECTROLYTE BALANCE 8 7 - 17 mmol/L (calc)    CALCIUM 9.5 8.6 - 10.3 mg/dL    PROTEIN, TOTAL 7.0 6.1 - 8.1 g/dL    ALBUMIN 4.4 3.6 - 5.1 g/dL    BILIRUBIN, TOTAL 0.3 0.2 - 1.2 mg/dL    ALKALINE PHOSPHATASE 71 36 - 130 U/L    AST 21 10 - 40 U/L    ALT 35 9 - 46 U/L   CBC    Collection Time: 04/05/25 10:42 AM   Result Value Ref Range    WHITE BLOOD CELL COUNT 6.4 3.8 - 10.8 Thousand/uL    RED BLOOD CELL COUNT 5.19 4.20 - 5.80 Million/uL    HEMOGLOBIN 14.6 13.2 - 17.1 g/dL    HEMATOCRIT 44.0 38.5 - 50.0 %    MCV 84.8 80.0 - 100.0 fL    MCH 28.1 27.0 - 33.0 pg    MCHC 33.2 32.0 - 36.0 g/dL    RDW 13.4 11.0 - 15.0 %    PLATELET COUNT 292 140 - 400 Thousand/uL    MPV 9.7 7.5 - 12.5 fL   TSH with reflex to Free T4 if abnormal    Collection Time: 04/05/25 10:42 AM   Result Value Ref Range    TSH W/REFLEX TO FT4 2.56 0.40 - 4.50 mIU/L   Vitamin D 25-Hydroxy,Total (for eval of Vitamin D levels)    Collection Time: 04/05/25 10:42 AM   Result Value Ref Range    VITAMIN D,25-OH,TOTAL,IA 30 30 - 100 ng/mL    Hemoglobin A1C    Collection Time: 04/05/25 10:42 AM   Result Value Ref Range    HEMOGLOBIN A1c 5.7 (H) <5.7 % of total Hgb    eAG (mg/dL) 117 mg/dL    eAG (mmol/L) 6.5 mmol/L     Recent labs reviewed with patient overall numbers are stable he will continue current medications and work on following a low-fat low-cholesterol low sugar diet.    Necessary refills provided.    Continue individual therapy    Return to our office 4 months with repeat fasting labs    Assessment/Plan   Problem List Items Addressed This Visit             ICD-10-CM    Essential hypertriglyceridemia E78.1     Continue dietary modification         Relevant Orders    Comprehensive Metabolic Panel    Lipid Panel    Follow Up In Primary Care - Established    Vitamin D deficiency E55.9     Continue to monitor supplement as needed         Relevant Orders    Vitamin D 25-Hydroxy,Total (for eval of Vitamin D levels)    Alcohol abuse, in remission F10.11     Remains in remission he continues with individual therapy on an every other week basis.         Relevant Medications    busPIRone (Buspar) 10 mg tablet    pramipexole (Mirapex) 0.125 mg tablet    prazosin (Minipress) 1 mg capsule    Other Relevant Orders    Comprehensive Metabolic Panel    Follow Up In Primary Care - Established    Impaired fasting glucose R73.01     A1c 5.7% continue dietary modification         Relevant Orders    Comprehensive Metabolic Panel    Hemoglobin A1C    Follow Up In Primary Care - Established    Current moderate episode of major depressive disorder without prior episode (Multi) - Primary F32.1     Stable continue current medication along with counseling         Relevant Medications    busPIRone (Buspar) 10 mg tablet    pramipexole (Mirapex) 0.125 mg tablet    prazosin (Minipress) 1 mg capsule    QUEtiapine (SeroqueL) 100 mg tablet    sertraline (Zoloft) 100 mg tablet    Other Relevant Orders    Comprehensive Metabolic Panel    Follow Up In Primary Care - Established     Anxiety F41.9     Stable continue current medications         Relevant Medications    busPIRone (Buspar) 10 mg tablet    hydrOXYzine pamoate (Vistaril) 50 mg capsule    pramipexole (Mirapex) 0.125 mg tablet    prazosin (Minipress) 1 mg capsule    sertraline (Zoloft) 100 mg tablet    Other Relevant Orders    Follow Up In Primary Care - Established    Other insomnia G47.09     Continue Seroquel         Relevant Medications    QUEtiapine (SeroqueL) 100 mg tablet    Restless leg syndrome G25.81     Continue Mirapex

## 2025-07-02 ENCOUNTER — OFFICE VISIT (OUTPATIENT)
Dept: PRIMARY CARE | Facility: CLINIC | Age: 45
End: 2025-07-02
Payer: COMMERCIAL

## 2025-07-02 VITALS
TEMPERATURE: 98.1 F | BODY MASS INDEX: 30.56 KG/M2 | SYSTOLIC BLOOD PRESSURE: 113 MMHG | WEIGHT: 238 LBS | HEART RATE: 66 BPM | OXYGEN SATURATION: 98 % | RESPIRATION RATE: 14 BRPM | DIASTOLIC BLOOD PRESSURE: 81 MMHG

## 2025-07-02 DIAGNOSIS — R53.83 OTHER FATIGUE: ICD-10-CM

## 2025-07-02 DIAGNOSIS — R30.0 DYSURIA: Primary | ICD-10-CM

## 2025-07-02 LAB
POC APPEARANCE, URINE: CLEAR
POC BILIRUBIN, URINE: NEGATIVE
POC BLOOD, URINE: NEGATIVE
POC COLOR, URINE: COLORLESS
POC GLUCOSE, URINE: NEGATIVE MG/DL
POC KETONES, URINE: NEGATIVE MG/DL
POC LEUKOCYTES, URINE: NEGATIVE
POC NITRITE,URINE: NEGATIVE
POC PH, URINE: 5 PH
POC PROTEIN, URINE: NEGATIVE MG/DL
POC SPECIFIC GRAVITY, URINE: <=1.005
POC UROBILINOGEN, URINE: 0.2 EU/DL

## 2025-07-02 PROCEDURE — 81002 URINALYSIS NONAUTO W/O SCOPE: CPT | Performed by: FAMILY MEDICINE

## 2025-07-02 PROCEDURE — 1036F TOBACCO NON-USER: CPT | Performed by: FAMILY MEDICINE

## 2025-07-02 PROCEDURE — 99214 OFFICE O/P EST MOD 30 MIN: CPT | Performed by: FAMILY MEDICINE

## 2025-07-02 RX ORDER — TAMSULOSIN HYDROCHLORIDE 0.4 MG/1
0.4 CAPSULE ORAL DAILY
Qty: 30 CAPSULE | Refills: 1 | Status: SHIPPED | OUTPATIENT
Start: 2025-07-02 | End: 2025-08-31

## 2025-07-02 ASSESSMENT — ENCOUNTER SYMPTOMS
GASTROINTESTINAL NEGATIVE: 1
SPEECH DIFFICULTY: 0
ACTIVITY CHANGE: 0
TREMORS: 0
DYSURIA: 1
DIAPHORESIS: 0
FATIGUE: 1
APPETITE CHANGE: 0
DIFFICULTY URINATING: 1
POLYPHAGIA: 0
CARDIOVASCULAR NEGATIVE: 1
POLYDIPSIA: 0
RESPIRATORY NEGATIVE: 1
FLANK PAIN: 1
DIZZINESS: 0
WEAKNESS: 0
CHILLS: 0
HEMATURIA: 0
NUMBNESS: 0
FEVER: 0
HEADACHES: 1
FACIAL ASYMMETRY: 0
FREQUENCY: 1
SEIZURES: 0
UNEXPECTED WEIGHT CHANGE: 0

## 2025-07-02 NOTE — PROGRESS NOTES
Subjective   Patient ID: Dakotah Pichardo is a 44 y.o. male who presents for right  side pain (Urinary frequency, dizziness, low energy symptoms 3 weeks) and Headache    Headache   Pertinent negatives include no dizziness, fever, numbness, seizures or weakness.      Patient comes in today mainly complaining of urinary frequency weaker urinary stream no pain no burning waking up at night around 6 times to urinate.  Not as often during the day.  Some right sided flank discomfort although it seems to be better.  Denies seeing any blood in the urine.  Denies fever or chills.  Slight headache but that has resolved.  Says he just feels tired and weak. Symptoms have been going on over the past 3 weeks.  Review of Systems   Constitutional:  Positive for fatigue. Negative for activity change, appetite change, chills, diaphoresis, fever and unexpected weight change.   HENT: Negative.     Respiratory: Negative.     Cardiovascular: Negative.    Gastrointestinal: Negative.    Endocrine: Negative for polydipsia, polyphagia and polyuria.   Genitourinary:  Positive for decreased urine volume, difficulty urinating, dysuria, flank pain and frequency. Negative for hematuria, penile discharge, penile pain, penile swelling, scrotal swelling, testicular pain and urgency.   Neurological:  Positive for headaches. Negative for dizziness, tremors, seizures, syncope, facial asymmetry, speech difficulty, weakness and numbness.       Objective   /81   Pulse 66   Temp 36.7 °C (98.1 °F)   Resp 14   Wt 108 kg (238 lb)   SpO2 98%   BMI 30.56 kg/m²     Physical Exam  Vitals and nursing note reviewed.   Constitutional:       General: He is not in acute distress.     Appearance: Normal appearance. He is not ill-appearing.   HENT:      Head: Normocephalic and atraumatic.      Right Ear: Tympanic membrane, ear canal and external ear normal.      Left Ear: Tympanic membrane, ear canal and external ear normal.      Mouth/Throat:      Pharynx:  Oropharynx is clear.   Eyes:      Extraocular Movements: Extraocular movements intact.   Cardiovascular:      Rate and Rhythm: Normal rate and regular rhythm.      Pulses: Normal pulses.      Heart sounds: Normal heart sounds.   Pulmonary:      Effort: Pulmonary effort is normal.      Breath sounds: Normal breath sounds.   Abdominal:      General: Abdomen is flat. Bowel sounds are normal. There is no distension.      Palpations: Abdomen is soft.      Tenderness: There is no abdominal tenderness. There is no right CVA tenderness or left CVA tenderness.   Musculoskeletal:         General: Normal range of motion.      Cervical back: Neck supple.   Skin:     General: Skin is warm.   Neurological:      General: No focal deficit present.      Mental Status: He is alert and oriented to person, place, and time. Mental status is at baseline.      Cranial Nerves: No cranial nerve deficit.      Motor: No weakness.      Gait: Gait normal.   Psychiatric:         Mood and Affect: Mood normal.     Urinalysis unremarkable    Renal ultrasound  CBC CMP PSA testosterone level    Flomax 0.4 mg once at bedtime    Call if anything worsens or go to ER otherwise return in 3 weeks to reassess his case    Assessment/Plan   Problem List Items Addressed This Visit           ICD-10-CM    Dysuria - Primary R30.0    Urinalysis unremarkable    Renal ultrasound  CBC CMP PSA testosterone level    Flomax 0.4 mg 1 nightly    Call if anything worsens return in 3 weeks for reassessment         Relevant Medications    tamsulosin (Flomax) 0.4 mg 24 hr capsule    Other Relevant Orders    POCT UA (nonautomated) manually resulted    CBC    Comprehensive Metabolic Panel    Prostate Specific Antigen    US renal complete    Follow Up In Primary Care - Established    Testosterone,Free and Total    Other fatigue R53.83    Relevant Orders    Testosterone,Free and Total

## 2025-07-02 NOTE — ASSESSMENT & PLAN NOTE
Urinalysis unremarkable    Renal ultrasound  CBC CMP PSA testosterone level    Flomax 0.4 mg 1 nightly    Call if anything worsens return in 3 weeks for reassessment

## 2025-07-03 ENCOUNTER — HOSPITAL ENCOUNTER (OUTPATIENT)
Dept: RADIOLOGY | Facility: CLINIC | Age: 45
Discharge: HOME | End: 2025-07-03
Payer: COMMERCIAL

## 2025-07-03 DIAGNOSIS — R30.0 DYSURIA: ICD-10-CM

## 2025-07-03 PROCEDURE — 76770 US EXAM ABDO BACK WALL COMP: CPT | Performed by: RADIOLOGY

## 2025-07-03 PROCEDURE — 76770 US EXAM ABDO BACK WALL COMP: CPT

## 2025-07-07 ENCOUNTER — TELEPHONE (OUTPATIENT)
Dept: PRIMARY CARE | Facility: CLINIC | Age: 45
End: 2025-07-07
Payer: COMMERCIAL

## 2025-07-07 DIAGNOSIS — M54.30 SCIATICA, UNSPECIFIED LATERALITY: Primary | ICD-10-CM

## 2025-07-07 DIAGNOSIS — R10.9 FLANK PAIN: ICD-10-CM

## 2025-07-07 RX ORDER — NAPROXEN 500 MG/1
500 TABLET ORAL 2 TIMES DAILY PRN
Qty: 60 TABLET | Refills: 1 | Status: SHIPPED | OUTPATIENT
Start: 2025-07-07 | End: 2025-10-05

## 2025-07-07 NOTE — PROGRESS NOTES
Ride sided flank pain  -colicky pain  -slightly burning with urination  -Patient has been taking Tylenol every 6-8 hours with slight relief.  Patient does have naproxen at home but he has not tried that because he was worried about kidney issues.  -Patient has been taking Flomax daily since he last saw physician  -Sounds like patient likely has a kidney stone.  Patient was offered to go to the emergency room for further evaluation and workup, but patient stated he needed to get into work.  Patient will go to the emergency room after work if pain is unbearable or has not improved with naproxen plus Tylenol plus fluids plus Flomax.  Patient likely will need referral to urology.  I will place this referral.

## 2025-07-07 NOTE — TELEPHONE ENCOUNTER
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       The patient called to state the test results from his renal US are in on his My Chart.     He would like someone to go over the results with him.     He stated he is in extreme pain.     Please advise.

## 2025-07-08 DIAGNOSIS — E55.9 VITAMIN D DEFICIENCY: ICD-10-CM

## 2025-07-08 DIAGNOSIS — F32.1 CURRENT MODERATE EPISODE OF MAJOR DEPRESSIVE DISORDER WITHOUT PRIOR EPISODE (MULTI): ICD-10-CM

## 2025-07-08 DIAGNOSIS — F10.11 ALCOHOL ABUSE, IN REMISSION: ICD-10-CM

## 2025-07-08 DIAGNOSIS — R73.01 IMPAIRED FASTING GLUCOSE: ICD-10-CM

## 2025-07-08 DIAGNOSIS — E78.1 ESSENTIAL HYPERTRIGLYCERIDEMIA: ICD-10-CM

## 2025-07-23 ENCOUNTER — APPOINTMENT (OUTPATIENT)
Dept: PRIMARY CARE | Facility: CLINIC | Age: 45
End: 2025-07-23
Payer: COMMERCIAL

## 2025-08-18 ENCOUNTER — APPOINTMENT (OUTPATIENT)
Dept: PRIMARY CARE | Facility: CLINIC | Age: 45
End: 2025-08-18
Payer: COMMERCIAL

## 2025-09-03 DIAGNOSIS — R10.9 FLANK PAIN: ICD-10-CM

## 2025-09-03 DIAGNOSIS — M54.30 SCIATICA, UNSPECIFIED LATERALITY: ICD-10-CM

## 2025-09-03 RX ORDER — NAPROXEN 500 MG/1
500 TABLET ORAL 2 TIMES DAILY PRN
Qty: 60 TABLET | Refills: 1 | Status: SHIPPED | OUTPATIENT
Start: 2025-09-03 | End: 2025-12-02